# Patient Record
Sex: MALE | Race: WHITE | Employment: UNEMPLOYED | URBAN - METROPOLITAN AREA
[De-identification: names, ages, dates, MRNs, and addresses within clinical notes are randomized per-mention and may not be internally consistent; named-entity substitution may affect disease eponyms.]

---

## 2022-10-03 ENCOUNTER — TELEPHONE (OUTPATIENT)
Dept: PHARMACY | Age: 32
End: 2022-10-03

## 2022-10-03 DIAGNOSIS — I42.2 HYPERTROPHIC CARDIOMYOPATHY (HCC): ICD-10-CM

## 2022-10-03 DIAGNOSIS — Z95.2 STATUS POST AORTIC VALVE REPLACEMENT: Primary | ICD-10-CM

## 2022-10-03 NOTE — TELEPHONE ENCOUNTER
New patient referral received. Spoke to patient and mother on speaker phone. Scheduled for 10/10/22. For Pharmacy Admin Tracking Only    Intervention Detail:   Total # of Interventions Recommended:    Total # of Interventions Accepted:   Time Spent (min): 10

## 2022-10-10 ENCOUNTER — TELEPHONE (OUTPATIENT)
Dept: PHARMACY | Age: 32
End: 2022-10-10

## 2022-10-10 ENCOUNTER — ANTI-COAG VISIT (OUTPATIENT)
Dept: PHARMACY | Age: 32
End: 2022-10-10
Payer: MEDICARE

## 2022-10-10 DIAGNOSIS — I42.2 HYPERTROPHIC CARDIOMYOPATHY (HCC): Primary | ICD-10-CM

## 2022-10-10 DIAGNOSIS — Z95.2 S/P AVR: ICD-10-CM

## 2022-10-10 LAB
INTERNATIONAL NORMALIZATION RATIO, POC: 2.6
POC INR: 2.6 INDEX
PROTHROMBIN TIME, POC: 30.7 SECONDS (ref 10–14.3)

## 2022-10-10 PROCEDURE — 99203 OFFICE O/P NEW LOW 30 MIN: CPT

## 2022-10-10 PROCEDURE — 85610 PROTHROMBIN TIME: CPT

## 2022-10-10 PROCEDURE — 36416 COLLJ CAPILLARY BLOOD SPEC: CPT

## 2022-10-10 RX ORDER — WARFARIN SODIUM 7.5 MG/1
7.5 TABLET ORAL
COMMUNITY

## 2022-10-10 RX ORDER — METOPROLOL SUCCINATE 100 MG/1
100 TABLET, EXTENDED RELEASE ORAL 2 TIMES DAILY
COMMUNITY

## 2022-10-10 RX ORDER — WARFARIN SODIUM 5 MG/1
5 TABLET ORAL DAILY
COMMUNITY

## 2022-10-10 RX ORDER — SPIRONOLACTONE 50 MG/1
50 TABLET, FILM COATED ORAL DAILY
COMMUNITY

## 2022-10-10 NOTE — PROGRESS NOTES
Medication Management Service  [facility]  [phone]    Visit Date: 10/10/2022   Subjective:       Kesha Guardado is a 28 y.o. male who presents to clinic today for anticoagulation monitoring and adjustment. Patient seen in clinic for warfarin management due to  Indication:   mechanical aortic valve. INR goal: of  2.5-3.5 on referral. Patient states goal previously was 2-3 . Duration of therapy: indefinite. Patient reports the following:   Adherent with regimen  Missed or extra doses:  None   Bleeding or thromboembolic side effects:  None  Significant medication changes:  None  Significant dietary changes: None  Significant alcohol or tobacco changes: None  Significant recent illness, disease state changes, or hospitalization:  None  Upcoming surgeries or procedures: November 4  Falls: None           Assessment and Plan     PT/INR done in office per protocol. INR today is 2.6, therapeutic. He has bridged with Lovenox in the past for procedures. He has not received pre-op directions yet from surgeon. Plan: Will continue current regimen of warfarin 7.5mg daily except 5mg on MWF. Recheck INR in 2 week(s). Patient is new to clinic. Patient provided written education pamphlet and verbal counseling regarding warfarin's mechanism of action, compliance in taking medication as instructed, PT/INR monitoring, follow up with healthcare provider, side effects, drug and food reactions and interactions and dietary advice. Patient verbalized understanding of dosing directions and information discussed. Dosing schedule given to patient including phone number, appointment date, and time. Progress note sent to referring office. Patient acknowledges working in consult agreement with pharmacist as referred by his/her physician.       Electronically signed by BRANDIN Murrieta [facility] on 10/10/22 at 10:16 AM EDT    For Pharmacy Admin Tracking Only    Intervention Detail:   Total # of Interventions Recommended:    Total # of Interventions Accepted:   Time Spent (min): 30

## 2022-10-10 NOTE — TELEPHONE ENCOUNTER
Patient stated at appointment his INR goal has been 2.0-3.0. Left message at office of Dr. Saintclair Hopes to confirm INR goal 2.0-3.0. Referral sent 2.5-3.5   and phone call previously to confirm goal.    For Pharmacy Admin Tracking Only    Intervention Detail:   Total # of Interventions Recommended:    Total # of Interventions Accepted:   Time Spent (min): 5

## 2022-10-20 NOTE — TELEPHONE ENCOUNTER
Per Dr. Baldemar Flores does want patient bridged with Lovenox for procedure 11/4/22. For Pharmacy Admin Tracking Only    Intervention Detail:   Total # of Interventions Recommended:    Total # of Interventions Accepted:   Time Spent (min): 5

## 2022-10-21 NOTE — PROGRESS NOTES
Medication Management Service  [facility]  [phone]    Visit Date: 10/24/2022   Subjective:       Sterling Barrientos is a 28 y.o. male who presents to clinic today for anticoagulation monitoring and adjustment. Patient seen in clinic for warfarin management due to  Indication:   mechanical aortic valve and hypertrophic cardiomyopathy . INR goal: of 2.0-3.0. Duration of therapy: indefinite. Patient reports the following:   Adherent with regimen  Missed or extra doses:  None   Bleeding or thromboembolic side effects:  None  Significant medication changes:  Zpack , Nyquil/Dayquil  Significant dietary changes: eating less when sick last week  Significant alcohol or tobacco changes: None  Significant recent illness, disease state changes, or hospitalization:  None  Upcoming surgeries or procedures:  surgery [facility] 11/4/22  Falls: None           Assessment and Plan     PT/INR done in office per protocol. INR today is 3.4, supratherapeutic. Plan:  Take warfarin 5mg tomorrow then will continue current regimen of warfarin 7.5mg daily except 5mg on MWF. Lovenox Rx called to [facility] at [facility]  #20 syringes/   Patient's procedure scheduled for 11/4/22. Hold warfarin for 7 days prior to procedure per surgeon. Last dose of warfarin on 10/27/22. Bridge with lovenox at 1mg/kg. Patient weight is 100kg. Start Lovenox 100 mg twice daily on 10/30/22. Last dose of Lovenox AM on 11/3/22. Restart warfarin and Lovenox following procedure at direction of physician performing procedure. Patient states he was told admission will be for 3-4 days. Requested call when he knows discharge date and dose instructions will be given at that time. Patient and his mother voice understanding of administration of injection. Written instructions provided also. Recheck INR in 2.5 week(s). Patient verbalized understanding of dosing directions and information discussed.  Dosing schedule given to patient including phone number, appointment date, and time. Progress note sent to referring office. Patient acknowledges working in consult agreement with pharmacist as referred by his/her physician.       Electronically signed by BRANDIN He [facility] on 10/21/22 at 12:21 PM EDT    For Pharmacy Admin Tracking Only    Intervention Detail: Dose Adjustment: 1, reason: Therapy Optimization and New Rx: 1, reason: Needs Additional Therapy  Total # of Interventions Recommended: 2  Total # of Interventions Accepted: 2  Time Spent (min): 30

## 2022-10-24 ENCOUNTER — ANTI-COAG VISIT (OUTPATIENT)
Dept: PHARMACY | Age: 32
End: 2022-10-24
Payer: MEDICARE

## 2022-10-24 DIAGNOSIS — Z95.2 S/P AVR: ICD-10-CM

## 2022-10-24 DIAGNOSIS — I42.2 HYPERTROPHIC CARDIOMYOPATHY (HCC): Primary | ICD-10-CM

## 2022-10-24 LAB
INTERNATIONAL NORMALIZATION RATIO, POC: 3.4
POC INR: 3.4 INDEX
PROTHROMBIN TIME, POC: 40.6 SECONDS (ref 10–14.3)

## 2022-10-24 PROCEDURE — 85610 PROTHROMBIN TIME: CPT

## 2022-10-24 PROCEDURE — 99213 OFFICE O/P EST LOW 20 MIN: CPT

## 2022-10-24 PROCEDURE — 36416 COLLJ CAPILLARY BLOOD SPEC: CPT

## 2022-10-24 RX ORDER — ENOXAPARIN SODIUM 100 MG/ML
100 INJECTION SUBCUTANEOUS 2 TIMES DAILY
Qty: 20 EACH | Refills: 0 | OUTPATIENT
Start: 2022-10-30 | End: 2022-11-07 | Stop reason: SDUPTHER

## 2022-11-07 ENCOUNTER — TELEPHONE (OUTPATIENT)
Dept: PHARMACY | Age: 32
End: 2022-11-07

## 2022-11-07 DIAGNOSIS — I42.2 HYPERTROPHIC CARDIOMYOPATHY (HCC): Primary | ICD-10-CM

## 2022-11-07 DIAGNOSIS — Z95.2 S/P AVR: ICD-10-CM

## 2022-11-07 RX ORDER — ENOXAPARIN SODIUM 100 MG/ML
100 INJECTION SUBCUTANEOUS 2 TIMES DAILY
Qty: 10 EACH | Refills: 1 | OUTPATIENT
Start: 2022-11-07 | End: 2022-11-17 | Stop reason: ALTCHOICE

## 2022-11-07 NOTE — TELEPHONE ENCOUNTER
Mother left message patient was discharged home last night and patient took 7.5mg of warfarin. Returned call. Decreased appetite, taking oxycodone as needed. They have not picked up Lovenox from [facility] yet that was ordered from [facility]. I called [address] and spoke to pharmacist- they had filled 90 day supply of Lovenox 40mg twice daily after they clarified Rx with directions of injecting 0.997ml from [facility] and received clarification to change to 1 syringe (0.4ml). Advised pharmacist to return and I would send new order. Called new order to voicemail at [facility]. From [facility] discharge notes:   Lovenox/Coumadin  You are being given a prescription for lovenox which you need to take therapeutic twice per day (BID) starting 11/6/2022 until you have your INR checked by your primary care doctor. Start taking your coumadin tomorrow 11/6. Make sure to set up an appointment with them to have the INR checked within the next week and they will determine when your INR is therapeutic.    enoxaparin (LOVENOX) 40 mg/0.4 mL   Inject 0.997 mL subcutaneously twice daily. 72 mL   0 11/05/2022 12/05/2022   enoxaparin (LOVENOX) 40 mg/0.4 mL   Inject 0.4 mL subcutaneously twice daily.          For Pharmacy Admin Tracking Only    Intervention Detail: Refill(s) Provided  Total # of Interventions Recommended: 1  Total # of Interventions Accepted: 1  Time Spent (min): 30

## 2022-11-10 ENCOUNTER — ANTI-COAG VISIT (OUTPATIENT)
Dept: PHARMACY | Age: 32
End: 2022-11-10
Payer: MEDICARE

## 2022-11-10 DIAGNOSIS — Z95.2 S/P AVR: ICD-10-CM

## 2022-11-10 DIAGNOSIS — I42.2 HYPERTROPHIC CARDIOMYOPATHY (HCC): Primary | ICD-10-CM

## 2022-11-10 LAB
INTERNATIONAL NORMALIZATION RATIO, POC: 1.6
POC INR: 1.6 INDEX
PROTHROMBIN TIME, POC: 18.6 SECONDS (ref 10–14.3)

## 2022-11-10 PROCEDURE — 99212 OFFICE O/P EST SF 10 MIN: CPT

## 2022-11-10 PROCEDURE — 36416 COLLJ CAPILLARY BLOOD SPEC: CPT

## 2022-11-10 PROCEDURE — 85610 PROTHROMBIN TIME: CPT

## 2022-11-10 NOTE — PROGRESS NOTES
Medication Management Service  [facility]  [phone]    Visit Date: 11/10/2022   Subjective:       Cherlyn Litten is a 33 y.o. male who presents to clinic today for anticoagulation monitoring and adjustment. Patient seen in clinic for warfarin management due to  Indication:   mechanical aortic valve. INR goal: of 2.0-3.0. Duration of therapy: indefinite. Patient reports the following:   Adherent with regimen  Missed or extra doses:  None   Bleeding or thromboembolic side effects:  None  Significant medication changes:  oxycodone, tylenol after surgery- oxycodone stopped now  Significant dietary changes: increasing post-surgery  Significant alcohol or tobacco changes: None  Significant recent illness, disease state changes, or hospitalization:  None  Upcoming surgeries or procedures:  None  Falls: None           Assessment and Plan     PT/INR done in office per protocol. INR today is 1.6, subtherapeutic, rising as expected after restarting warfarin 11/6. Plan: Take warfarin 10mg today then will continue current regimen of warfarin 7.5mg daily except 5mg on MWF. Continue Lovenox 100mg injected twice daily. Recheck INR in 4 days     Patient verbalized understanding of dosing directions and information discussed. Dosing schedule given to patient including phone number, appointment date, and time. Progress note sent to referring office. Patient acknowledges working in consult agreement with pharmacist as referred by his/her physician.       Electronically signed by BRANDIN Crockett [facility] on 11/10/22 at 1:39 PM EST    For Pharmacy Admin Tracking Only    Intervention Detail: Dose Adjustment: 1, reason: Therapy Optimization  Total # of Interventions Recommended: 1  Total # of Interventions Accepted: 1  Time Spent (min): 20

## 2022-11-14 ENCOUNTER — ANTI-COAG VISIT (OUTPATIENT)
Dept: PHARMACY | Age: 32
End: 2022-11-14
Payer: MEDICARE

## 2022-11-14 ENCOUNTER — TELEPHONE (OUTPATIENT)
Dept: PHARMACY | Age: 32
End: 2022-11-14

## 2022-11-14 DIAGNOSIS — I42.2 HYPERTROPHIC CARDIOMYOPATHY (HCC): Primary | ICD-10-CM

## 2022-11-14 DIAGNOSIS — Z95.2 S/P AVR: ICD-10-CM

## 2022-11-14 LAB
INTERNATIONAL NORMALIZATION RATIO, POC: 1.9
POC INR: 1.9 INDEX
PROTHROMBIN TIME, POC: 22.7 SECONDS (ref 10–14.3)

## 2022-11-14 PROCEDURE — 99212 OFFICE O/P EST SF 10 MIN: CPT

## 2022-11-14 PROCEDURE — 36416 COLLJ CAPILLARY BLOOD SPEC: CPT

## 2022-11-14 NOTE — PROGRESS NOTES
Medication Management Service  [facility]  [phone]    Visit Date: 11/14/2022   Subjective:       Steven Simmons is a 33 y.o. male who presents to clinic today for anticoagulation monitoring and adjustment. Patient seen in clinic for warfarin management due to  Indication:   mechanical aortic valve. INR goal: of 2.0-3.0. Duration of therapy: indefinite. Patient reports the following:   Adherent with regimen  Missed or extra doses:  None   Bleeding or thromboembolic side effects: bleeding at site of lovenox injection   Significant medication changes:  None  Significant dietary changes: egg roll  Significant alcohol or tobacco changes: None  Significant recent illness, disease state changes, or hospitalization:  bruising and pain from Lovenox  Upcoming surgeries or procedures:  None  Falls: None           Assessment and Plan     PT/INR done in office per protocol. INR today is 1.9, subtherapeutic. Patient reports Lovenox injections becoming more painful and he is \"running out of real estate. \" Bruises appear within normal range. He does report some bleeding from 2 of the injection sites upon administration. Plan: Recommended he continue Lovenox injections until INR therapeutic. Patient has 3 syringes at home and he would prefer to finish his available stock. Advised a refill is available on his prescription. Advised if he can not tolerate injections, he should prioritize continuing for next 24 hours. Take warfarin 10mg today then  will continue current regimen of warfarin 7.5mg daily except 5mg on MWF. Recheck INR in 3 days. Patient verbalized understanding of dosing directions and information discussed. Dosing schedule given to patient including phone number, appointment date, and time. Progress note sent to referring office. Patient acknowledges working in consult agreement with pharmacist as referred by his/her physician.       Electronically signed by Markus Boswell [facility] on 11/14/22 at 3:13 PM EST    For Pharmacy Admin Tracking Only    Intervention Detail: Dose Adjustment: 1, reason: Therapy Optimization  Total # of Interventions Recommended: 1  Total # of Interventions Accepted: 1  Time Spent (min): 15

## 2022-11-14 NOTE — TELEPHONE ENCOUNTER
Patient requested later appointment. Moved 9:15am to 3:15pm today. For Pharmacy Admin Tracking Only    Intervention Detail:   Total # of Interventions Recommended:    Total # of Interventions Accepted:   Time Spent (min): 5

## 2022-11-17 ENCOUNTER — ANTI-COAG VISIT (OUTPATIENT)
Dept: PHARMACY | Age: 32
End: 2022-11-17
Payer: MEDICARE

## 2022-11-17 DIAGNOSIS — Z95.2 S/P AVR: ICD-10-CM

## 2022-11-17 DIAGNOSIS — I42.2 HYPERTROPHIC CARDIOMYOPATHY (HCC): Primary | ICD-10-CM

## 2022-11-17 LAB
INTERNATIONAL NORMALIZATION RATIO, POC: 2
POC INR: 2 INDEX
PROTHROMBIN TIME, POC: 23.4 SECONDS (ref 10–14.3)

## 2022-11-17 PROCEDURE — 36416 COLLJ CAPILLARY BLOOD SPEC: CPT

## 2022-11-17 PROCEDURE — 85610 PROTHROMBIN TIME: CPT

## 2022-11-17 PROCEDURE — 99212 OFFICE O/P EST SF 10 MIN: CPT

## 2022-11-17 NOTE — PROGRESS NOTES
Medication Management Service  [facility]  [phone]    Visit Date: 11/17/2022   Subjective:       Roger Lehman is a 33 y.o. male who presents to clinic today for anticoagulation monitoring and adjustment. Patient seen in clinic for warfarin management due to  Indication:   mechanical aortic valve. INR goal: of 2.0-3.0. Duration of therapy: indefinite. Patient reports the following:   Adherent with regimen  Missed or extra doses:  None   Bleeding or thromboembolic side effects:  None  Significant medication changes:  None  Significant dietary changes: None  Significant alcohol or tobacco changes: None  Significant recent illness, disease state changes, or hospitalization:  None  Upcoming surgeries or procedures:  None  Falls: None           Assessment and Plan     PT/INR done in office per protocol. INR today is 2.0, therapeutic. Last lovenox shot he took was Monday night due to pain/bruising. INR rising slowly after 10mg doses. Last 7 day dose of 52.5mg. Advised to call if he starts drinking Ensure. Plan:  Increase weekly dose ~6% to warfarin 7.5mg daily except 5mg on Mondays and Thursdays. Recheck INR in 1.5 week(s). Patient verbalized understanding of dosing directions and information discussed. Dosing schedule given to patient including phone number, appointment date, and time. Progress note sent to referring office. Patient acknowledges working in consult agreement with pharmacist as referred by his/her physician.       Electronically signed by Zohreh Adan [facility] on 11/17/22 at 9:24 AM EST    For Pharmacy Admin Tracking Only    Intervention Detail: Dose Adjustment: 1, reason: Therapy Optimization  Total # of Interventions Recommended: 1  Total # of Interventions Accepted: 1  Time Spent (min): 15

## 2022-11-28 ENCOUNTER — ANTI-COAG VISIT (OUTPATIENT)
Dept: PHARMACY | Age: 32
End: 2022-11-28
Payer: MEDICARE

## 2022-11-28 DIAGNOSIS — Z95.2 S/P AVR: ICD-10-CM

## 2022-11-28 DIAGNOSIS — I42.2 HYPERTROPHIC CARDIOMYOPATHY (HCC): Primary | ICD-10-CM

## 2022-11-28 LAB
INTERNATIONAL NORMALIZATION RATIO, POC: 2.6
POC INR: 2.6 INDEX
PROTHROMBIN TIME, POC: 31.7 SECONDS (ref 10–14.3)

## 2022-11-28 PROCEDURE — 99211 OFF/OP EST MAY X REQ PHY/QHP: CPT

## 2022-11-28 PROCEDURE — 85610 PROTHROMBIN TIME: CPT

## 2022-11-28 PROCEDURE — 36416 COLLJ CAPILLARY BLOOD SPEC: CPT

## 2022-11-28 NOTE — PROGRESS NOTES
Medication Management Service  [facility]  [phone]    Visit Date: 11/28/2022   Subjective:       Lars Valadez is a 33 y.o. male who presents to clinic today for anticoagulation monitoring and adjustment. Patient seen in clinic for warfarin management due to  Indication:   mechanical aortic valve. INR goal: of 2.0-3.0. Duration of therapy: indefinite. Patient reports the following:   Adherent with regimen  Missed or extra doses:  None   Bleeding or thromboembolic side effects:  None  Significant medication changes:  None  Significant dietary changes: None  Significant alcohol or tobacco changes: None  Significant recent illness, disease state changes, or hospitalization:  None  Upcoming surgeries or procedures:  None  Falls: None           Assessment and Plan     PT/INR done in office per protocol. INR today is 2.6, therapeutic. He has appointment at [facility] end of this week. Plan: Will continue current regimen of warfarin 7.5mg daily except 5mg on Mondays and Thursdays. Recheck INR in 3 week(s). Patient verbalized understanding of dosing directions and information discussed. Dosing schedule given to patient including phone number, appointment date, and time. Progress note sent to referring office. Patient acknowledges working in consult agreement with pharmacist as referred by his/her physician. Electronically signed by Bhumika Figueroa [facility] on 11/28/22 at 11:35 AM EST    For Pharmacy Admin Tracking Only    Intervention Detail:   Total # of Interventions Recommended:    Total # of Interventions Accepted:   Time Spent (min): 15

## 2022-12-19 ENCOUNTER — ANTI-COAG VISIT (OUTPATIENT)
Dept: PHARMACY | Age: 32
End: 2022-12-19
Payer: MEDICARE

## 2022-12-19 DIAGNOSIS — Z95.2 S/P AVR: ICD-10-CM

## 2022-12-19 DIAGNOSIS — I42.2 HYPERTROPHIC CARDIOMYOPATHY (HCC): Primary | ICD-10-CM

## 2022-12-19 LAB
INTERNATIONAL NORMALIZATION RATIO, POC: 2.8
POC INR: 2.8 INDEX
PROTHROMBIN TIME, POC: 33.5 SECONDS (ref 10–14.3)

## 2022-12-19 PROCEDURE — 36416 COLLJ CAPILLARY BLOOD SPEC: CPT

## 2022-12-19 PROCEDURE — 99211 OFF/OP EST MAY X REQ PHY/QHP: CPT

## 2022-12-19 PROCEDURE — 85610 PROTHROMBIN TIME: CPT

## 2022-12-19 NOTE — PROGRESS NOTES
Medication Management Service  [facility]  [phone]    Visit Date: 12/19/2022   Subjective:       Kevin Carson is a 33 y.o. male who presents to clinic today for anticoagulation monitoring and adjustment. Patient seen in clinic for warfarin management due to  Indication:   mechanical aortic valve and hypertrophic cardiomyopathy . INR goal: of 2.0-3.0. Duration of therapy: indefinite. Patient reports the following:   Adherent with regimen  Missed or extra doses:  None   Bleeding or thromboembolic side effects:  None  Significant medication changes:  None  Significant dietary changes: None  Significant alcohol or tobacco changes: None  Significant recent illness, disease state changes, or hospitalization:  None  Upcoming surgeries or procedures:  None  Falls: None           Assessment and Plan     PT/INR done in office per protocol. INR today is 2.8, therapeutic. Plan: Will continue current regimen of warfarin 7.5mg daily except 5mg on Mondays and Thursdays. Recheck INR in 4.5 week(s). Patient verbalized understanding of dosing directions and information discussed. Dosing schedule given to patient including phone number, appointment date, and time. Progress note sent to referring office. Patient acknowledges working in consult agreement with pharmacist as referred by his/her physician. Electronically signed by Maryana Rangel, [facility] on 12/19/22 at 10:25 AM EST    For Pharmacy Admin Tracking Only    Intervention Detail:   Total # of Interventions Recommended:    Total # of Interventions Accepted:   Time Spent (min): 15

## 2023-01-19 ENCOUNTER — ANTI-COAG VISIT (OUTPATIENT)
Dept: PHARMACY | Age: 33
End: 2023-01-19
Payer: MEDICARE

## 2023-01-19 DIAGNOSIS — Z95.2 S/P AVR: ICD-10-CM

## 2023-01-19 DIAGNOSIS — I42.2 HYPERTROPHIC CARDIOMYOPATHY (HCC): Primary | ICD-10-CM

## 2023-01-19 LAB
INTERNATIONAL NORMALIZATION RATIO, POC: 2.5
POC INR: 2.5 INDEX
PROTHROMBIN TIME, POC: 30.1 SECONDS (ref 10–14.3)

## 2023-01-19 PROCEDURE — 36416 COLLJ CAPILLARY BLOOD SPEC: CPT

## 2023-01-19 PROCEDURE — 99212 OFFICE O/P EST SF 10 MIN: CPT

## 2023-01-19 PROCEDURE — 85610 PROTHROMBIN TIME: CPT

## 2023-01-19 RX ORDER — WARFARIN SODIUM 7.5 MG/1
7.5 TABLET ORAL DAILY
Qty: 65 TABLET | Refills: 1 | OUTPATIENT
Start: 2023-01-19

## 2023-01-19 NOTE — PROGRESS NOTES
Medication Management Service  [facility]  [phone]    Visit Date: 1/19/2023   Subjective:       Alberto Dixon is a 33 y.o. male who presents to clinic today for anticoagulation monitoring and adjustment. Patient seen in clinic for warfarin management due to  Indication:   mechanical aortic valve, hypertrophic cardiomyopathy  INR goal: of 2.0-3.0. Duration of therapy: indefinite. Patient reports the following:   Adherent with regimen  Missed or extra doses:  None   Bleeding or thromboembolic side effects:  None  Significant medication changes:  None  Significant dietary changes: None  Significant alcohol or tobacco changes: None  Significant recent illness, disease state changes, or hospitalization:  None  Upcoming surgeries or procedures:  None  Falls: into mud today           Assessment and Plan     PT/INR done in office per protocol. INR today is 2.5, therapeutic. Refill needed  Plan: Will continue current regimen of warfarin 7.5mg daily except 5mg on Mondays and Thursdays. Refill called to [address] [place] [address] INR in 4 week(s). Patient verbalized understanding of dosing directions and information discussed. Dosing schedule given to patient including phone number, appointment date, and time. Progress note sent to referring office. Patient acknowledges working in consult agreement with pharmacist as referred by his/her physician.       Electronically signed by BRANDIN Ames [facility] on 1/19/23 at 8:22  [facility] Only    Intervention Detail: Refill(s) Provided  Total # of Interventions Recommended: 1  Total # of Interventions Accepted: 1  Time Spent (min): 15

## 2023-02-16 ENCOUNTER — ANTI-COAG VISIT (OUTPATIENT)
Dept: PHARMACY | Age: 33
End: 2023-02-16
Payer: MEDICARE

## 2023-02-16 DIAGNOSIS — Z95.2 S/P AVR: ICD-10-CM

## 2023-02-16 DIAGNOSIS — I42.2 HYPERTROPHIC CARDIOMYOPATHY (HCC): Primary | ICD-10-CM

## 2023-02-16 LAB
INTERNATIONAL NORMALIZATION RATIO, POC: 2.2
POC INR: 2.2 INDEX
PROTHROMBIN TIME, POC: 26.2 SECONDS (ref 10–14.3)

## 2023-02-16 PROCEDURE — 85610 PROTHROMBIN TIME: CPT

## 2023-02-16 PROCEDURE — 99211 OFF/OP EST MAY X REQ PHY/QHP: CPT

## 2023-02-16 PROCEDURE — 36416 COLLJ CAPILLARY BLOOD SPEC: CPT

## 2023-02-16 NOTE — PROGRESS NOTES
Medication Management Service  [facility]  [phone]    Visit Date: 2/16/2023   Subjective:       Allyson Sellers is a 33 y.o. male who presents to clinic today for anticoagulation monitoring and adjustment. Patient seen in clinic for warfarin management due to  Indication:   mechanical aortic valve and hypertrophic cardiomyopatjy . INR goal: of 2.0-3.0. Duration of therapy: indefinite. Patient reports the following:   Adherent with regimen  Missed or extra doses:  possible missed dose  Bleeding or thromboembolic side effects: cut finger day 3  Significant medication changes:  None  Significant dietary changes: None  Significant alcohol or tobacco changes: None  Significant recent illness, disease state changes, or hospitalization:  None  Upcoming surgeries or procedures:  None  Falls: None           Assessment and Plan     PT/INR done in office per protocol. INR today is 2.2, therapeutic. Cut on left ring finger, today is day 3, is bandaged. Plan: Will continue current regimen of warfarin 7.5mg daily except 5mg on Mondays and Thursdays. Recheck INR in 4 week(s)- will schedule with [facility] after her next visit. Patient verbalized understanding of dosing directions and information discussed. Dosing schedule given to patient including phone number, appointment date, and time. Progress note sent to referring office. Patient acknowledges working in consult agreement with pharmacist as referred by his/her physician. Electronically signed by BRANDIN Irwin [facility] on 2/16/23 at 10:28 AM EST    For Pharmacy Admin Tracking Only    Intervention Detail:   Total # of Interventions Recommended:    Total # of Interventions Accepted:   Time Spent (min): 15

## 2023-03-23 ENCOUNTER — TELEPHONE (OUTPATIENT)
Dept: PHARMACY | Age: 33
End: 2023-03-23

## 2023-03-23 NOTE — TELEPHONE ENCOUNTER
Spoke to Mercedes in person and scheduled patient for 4/3/23. For Pharmacy Admin Tracking Only    Intervention Detail:   Total # of Interventions Recommended:    Total # of Interventions Accepted:   Time Spent (min): 5

## 2023-03-30 ENCOUNTER — TELEPHONE (OUTPATIENT)
Dept: PHARMACY | Age: 33
End: 2023-03-30

## 2023-03-30 NOTE — TELEPHONE ENCOUNTER
Confirmed appointment for 4/3/23. For Pharmacy Admin Tracking Only    Intervention Detail:   Total # of Interventions Recommended:    Total # of Interventions Accepted:   Time Spent (min): 5

## 2023-04-17 ENCOUNTER — ANTI-COAG VISIT (OUTPATIENT)
Dept: PHARMACY | Age: 33
End: 2023-04-17
Payer: MEDICARE

## 2023-04-17 DIAGNOSIS — Z95.2 S/P AVR: ICD-10-CM

## 2023-04-17 DIAGNOSIS — I42.2 HYPERTROPHIC CARDIOMYOPATHY (HCC): Primary | ICD-10-CM

## 2023-04-17 LAB
INTERNATIONAL NORMALIZATION RATIO, POC: 1.8
POC INR: 1.8 INDEX
PROTHROMBIN TIME, POC: 22.1 SECONDS (ref 10–14.3)

## 2023-04-17 PROCEDURE — 99212 OFFICE O/P EST SF 10 MIN: CPT

## 2023-04-17 PROCEDURE — 85610 PROTHROMBIN TIME: CPT

## 2023-04-17 PROCEDURE — 36416 COLLJ CAPILLARY BLOOD SPEC: CPT

## 2023-04-17 NOTE — PROGRESS NOTES
Medication Management Service  [facility]  [phone]    Visit Date: 4/17/2023   Subjective:       Catalino Figueroa is a 33 y.o. male who presents to clinic today for anticoagulation monitoring and adjustment. Patient seen in clinic for warfarin management due to  Indication:   mechanical aortic valve and hypertrophic cardiomyopathy . INR goal: of 2.0-3.0. Duration of therapy: indefinite. Patient reports the following:   Adherent with regimen  Missed or extra doses:  None   Bleeding or thromboembolic side effects:  None  Significant medication changes:  None  Significant dietary changes: None  Significant alcohol or tobacco changes: None  Significant recent illness, disease state changes, or hospitalization:  None  Upcoming surgeries or procedures:  None  Falls: None           Assessment and Plan     PT/INR done in office per protocol. INR today is 1.8, subtherapeutic. INR down from 3.4 at visit 1 week ago. He goes to [facility] 4[facility]19. Plan: Increase weekly dose ~5% to warfarin 7.5mg daily except 5mg on Thursdays. Recheck INR in 1.5 week(s). Patient verbalized understanding of dosing directions and information discussed. Dosing schedule given to patient including phone number, appointment date, and time. Progress note sent to referring office. Patient acknowledges working in consult agreement with pharmacist as referred by his/her physician.       Electronically signed by Chiqui Little [address] [place] [address] on 4/17/23 at 10:18 AM EDT    For Pharmacy Admin Tracking Only    Intervention Detail: Dose Adjustment: 1, reason: Therapy Optimization  Total # of Interventions Recommended: 1  Total # of Interventions Accepted: 1  Time Spent (min): 15

## 2023-04-27 ENCOUNTER — TELEPHONE (OUTPATIENT)
Dept: PHARMACY | Age: 33
End: 2023-04-27

## 2023-04-27 ENCOUNTER — ANTI-COAG VISIT (OUTPATIENT)
Dept: PHARMACY | Age: 33
End: 2023-04-27
Payer: MEDICARE

## 2023-04-27 DIAGNOSIS — I42.2 HYPERTROPHIC CARDIOMYOPATHY (HCC): Primary | ICD-10-CM

## 2023-04-27 DIAGNOSIS — Z95.2 S/P AVR: ICD-10-CM

## 2023-04-27 LAB
INTERNATIONAL NORMALIZATION RATIO, POC: 2.2
POC INR: 2.2 INDEX
PROTHROMBIN TIME, POC: 26.8 SECONDS (ref 10–14.3)

## 2023-04-27 PROCEDURE — 36416 COLLJ CAPILLARY BLOOD SPEC: CPT

## 2023-04-27 PROCEDURE — 85610 PROTHROMBIN TIME: CPT

## 2023-04-27 PROCEDURE — 99212 OFFICE O/P EST SF 10 MIN: CPT

## 2023-04-27 RX ORDER — WARFARIN SODIUM 7.5 MG/1
7.5 TABLET ORAL DAILY
Qty: 78 TABLET | Refills: 0 | OUTPATIENT
Start: 2023-04-27

## 2023-04-27 NOTE — PROGRESS NOTES
Medication Management Service  [facility]  [phone]    Visit Date: 4/27/2023   Subjective:       Darwin Burgos is a 33 y.o. male who presents to clinic today for anticoagulation monitoring and adjustment. Patient seen in clinic for warfarin management due to  Indication:   mechanical aortic valve. INR goal: of 2.0-3.0. Duration of therapy: indefinite. Patient reports the following:   Adherent with regimen  Missed or extra doses:  None   Bleeding or thromboembolic side effects:  None  Significant medication changes:  None  Significant dietary changes:   Significant alcohol or tobacco changes: None  Significant recent illness, disease state changes, or hospitalization:  None  Upcoming surgeries or procedures:  None  Falls: None           Assessment and Plan     PT/INR done in office per protocol. INR today is 2.2, therapeutic. May 16 procedure at [facility]- does not know if warfarin is to be held. I will contact [facility] regarding pre-procedure instructions  Refill needed  Plan: Will continue current regimen of warfarin 7.5mg daily except 5mg on Thursdays. Refill called to Monik Collins  to Katherin Kitchen RPh  Recheck INR in 2 week(s). Patient verbalized understanding of dosing directions and information discussed. Dosing schedule given to patient including phone number, appointment date, and time. Progress note sent to referring office. Patient acknowledges working in consult agreement with pharmacist as referred by his/her physician.       Electronically signed by Amrita Rodriguez [address] [place] [address] on 4/27/23 at 10:04 AM EDT    For Pharmacy Admin Tracking Only    Intervention Detail: Refill(s) Provided  Total # of Interventions Recommended: 1  Total # of Interventions Accepted: 1  Time Spent (min): 15

## 2023-05-08 RX ORDER — ENOXAPARIN SODIUM 100 MG/ML
90 INJECTION SUBCUTANEOUS 2 TIMES DAILY
Qty: 20 EACH | Refills: 1 | OUTPATIENT
Start: 2023-05-08

## 2023-05-08 NOTE — TELEPHONE ENCOUNTER
Lovenox called to [facility] to [employer]. They will order more in order to have full quantity needed. For Pharmacy Admin Tracking Only    Intervention Detail:   Total # of Interventions Recommended:    Total # of Interventions Accepted:   Time Spent (min): 5

## 2023-05-11 ENCOUNTER — TELEPHONE (OUTPATIENT)
Dept: PHARMACY | Age: 33
End: 2023-05-11

## 2023-05-11 ENCOUNTER — ANTI-COAG VISIT (OUTPATIENT)
Dept: PHARMACY | Age: 33
End: 2023-05-11
Payer: MEDICARE

## 2023-05-11 DIAGNOSIS — Z95.2 S/P AVR: ICD-10-CM

## 2023-05-11 DIAGNOSIS — I42.2 HYPERTROPHIC CARDIOMYOPATHY (HCC): Primary | ICD-10-CM

## 2023-05-11 LAB
INTERNATIONAL NORMALIZATION RATIO, POC: 2
POC INR: 2 INDEX
PROTHROMBIN TIME, POC: 24.3 SECONDS (ref 10–14.3)

## 2023-05-11 PROCEDURE — 36416 COLLJ CAPILLARY BLOOD SPEC: CPT

## 2023-05-11 PROCEDURE — 99212 OFFICE O/P EST SF 10 MIN: CPT

## 2023-05-11 PROCEDURE — 85610 PROTHROMBIN TIME: CPT

## 2023-05-11 NOTE — PROGRESS NOTES
Medication Management Service  [facility]  [phone]    Visit Date: 5/11/2023   Subjective:       Tabatha Mcmanus is a 33 y.o. male who presents to clinic today for anticoagulation monitoring and adjustment. Patient seen in clinic for warfarin management due to  Indication:   mechanical aortic valve. INR goal: of 2.0-3.0. Duration of therapy: indefinite. Patient reports the following:   Adherent with regimen  Missed or extra doses:  None   Bleeding or thromboembolic side effects:  None  Significant medication changes:  None  Significant dietary changes: None  Significant alcohol or tobacco changes: None  Significant recent illness, disease state changes, or hospitalization:  None  Upcoming surgeries or procedures:  5/16  Falls: None           Assessment and Plan     PT/INR done in office per protocol. INR today is 2.0, therapeutic. Plan: Will continue current regimen of warfarin 7.5mg daily except 5mg on Thursdays. Patient's procedure scheduled for 5/16/23. Hold warfarin for 5 days prior to procedure per patient's surgeon. Last dose of warfarin on 5/10/23. Bridge with lovenox at 1mg/kg. Patient weight is 87.2kg. Start Lovenox 90 mg twice daily on 5/13/23. Last dose of Lovenox AM on 5/15/23. Restart warfarin and Lovenox following procedure at direction of physician performing procedure. Continue Lovenox and warfarin 10mg daily for 2 days then return to maintenance until INR therapeutic. Recheck INR in 1.5 week(s). Patient verbalized understanding of dosing directions and information discussed. Dosing schedule given to patient including phone number, appointment date, and time. Progress note sent to referring office. Patient acknowledges working in consult agreement with pharmacist as referred by his/her physician.       Electronically signed by Floyd Morel, [address] [place] [address] on 5/11/23 at 3:10 PM EDT    For Pharmacy Admin Tracking Only    Intervention Detail: Dose

## 2023-05-11 NOTE — TELEPHONE ENCOUNTER
Rescheduled for same day later at 3:00pm. For Pharmacy Admin Tracking Only    Intervention Detail:   Total # of Interventions Recommended:    Total # of Interventions Accepted:   Time Spent (min): 5

## 2023-05-22 ENCOUNTER — ANTI-COAG VISIT (OUTPATIENT)
Dept: PHARMACY | Age: 33
End: 2023-05-22
Payer: MEDICARE

## 2023-05-22 DIAGNOSIS — I42.2 HYPERTROPHIC CARDIOMYOPATHY (HCC): Primary | ICD-10-CM

## 2023-05-22 DIAGNOSIS — Z95.2 S/P AVR: ICD-10-CM

## 2023-05-22 LAB
INTERNATIONAL NORMALIZATION RATIO, POC: 1.7
POC INR: 1.7 INDEX
PROTHROMBIN TIME, POC: 19.9 SECONDS (ref 10–14.3)

## 2023-05-22 PROCEDURE — 36416 COLLJ CAPILLARY BLOOD SPEC: CPT

## 2023-05-22 PROCEDURE — 99212 OFFICE O/P EST SF 10 MIN: CPT

## 2023-05-22 PROCEDURE — 85610 PROTHROMBIN TIME: CPT

## 2023-05-22 NOTE — PROGRESS NOTES
Medication Management Service  [facility]  [phone]    Visit Date: 5/22/2023   Subjective:       Rena Simpson is a 33 y.o. male who presents to clinic today for anticoagulation monitoring and adjustment. Patient seen in clinic for warfarin management due to  Indication:   mechanical aortic valve. INR goal: of 2.0-3.0. Duration of therapy: indefinite. Patient reports the following:   Adherent with regimen  Missed or extra doses:  held 6 days total for procedure 5/16  Bleeding or thromboembolic side effects:  None  Significant medication changes:  None  Significant dietary changes: Atkins meal replacement bars  Significant alcohol or tobacco changes: None  Significant recent illness, disease state changes, or hospitalization:  None  Upcoming surgeries or procedures:  None  Falls: None           Assessment and Plan     PT/INR done in office per protocol. INR today is 1.7, subtherapeutic, restarted warfarin 5/17 following procedure        Plan:  Continue Lovenox twice daily until INR therapeutic. Take warfarin 10mg today then will continue current regimen of warfarin 7.5mg daily except 5mg on Thursdays. .     Recheck INR in 3 days. Patient verbalized understanding of dosing directions and information discussed. Dosing schedule given to patient including phone number, appointment date, and time. Progress note sent to referring office. Patient acknowledges working in consult agreement with pharmacist as referred by his/her physician.       Electronically signed by Reji Eubanks, [address] [place] [address] on 5/22/23 at 11:42 AM EDT    For Pharmacy Admin Tracking Only    Intervention Detail: Dose Adjustment: 1, reason: Therapy Optimization  Total # of Interventions Recommended: 1  Total # of Interventions Accepted: 1  Time Spent (min): 15

## 2023-05-25 ENCOUNTER — ANTI-COAG VISIT (OUTPATIENT)
Dept: PHARMACY | Age: 33
End: 2023-05-25
Payer: MEDICARE

## 2023-05-25 DIAGNOSIS — I42.2 HYPERTROPHIC CARDIOMYOPATHY (HCC): Primary | ICD-10-CM

## 2023-05-25 DIAGNOSIS — Z95.2 S/P AVR: ICD-10-CM

## 2023-05-25 LAB
INTERNATIONAL NORMALIZATION RATIO, POC: 2.2
POC INR: 2.2 INDEX
PROTHROMBIN TIME, POC: 25.8 SECONDS (ref 10–14.3)

## 2023-05-25 PROCEDURE — 36416 COLLJ CAPILLARY BLOOD SPEC: CPT

## 2023-05-25 PROCEDURE — 99212 OFFICE O/P EST SF 10 MIN: CPT

## 2023-05-25 PROCEDURE — 85610 PROTHROMBIN TIME: CPT

## 2023-05-25 NOTE — PROGRESS NOTES
Medication Management Service  [facility]  [phone]    Visit Date: 5/25/2023   Subjective:       Elias Ahumada is a 33 y.o. male who presents to clinic today for anticoagulation monitoring and adjustment. Patient seen in clinic for warfarin management due to  Indication:   mechanical aortic valve. INR goal: of 2.0-3.0. Duration of therapy: indefinite. Patient reports the following:   Adherent with regimen  Missed or extra doses:  None   Bleeding or thromboembolic side effects:  None  Significant medication changes:  None  Significant dietary changes: None  Significant alcohol or tobacco changes: None  Significant recent illness, disease state changes, or hospitalization:  None  Upcoming surgeries or procedures:  None  Falls: None           Assessment and Plan     PT/INR done in office per protocol. INR today is 2.2, therapeutic. Last 7 day dose of 57.5mg, with 3 days of 10mg since restarting warfarin following procedure. Patient likely needs higher maintenance dose. INR prior to procedure was 2.0, before he started new meal replacement bar. Plan: Stop Lovenox. Increase weekly dose 5% to warfarin 7.5mg daily. Recheck INR in 2 week(s). Patient verbalized understanding of dosing directions and information discussed. Dosing schedule given to patient including phone number, appointment date, and time. Progress note sent to referring office. Patient acknowledges working in consult agreement with pharmacist as referred by his/her physician.       Electronically signed by Richard Preciado, [address] [place] [address] on 5/25/23 at 11:55 AM EDT    For Pharmacy Admin Tracking Only    Intervention Detail: Dose Adjustment: 1, reason: Therapy Optimization  Total # of Interventions Recommended: 1  Total # of Interventions Accepted: 1  Time Spent (min): 15

## 2023-06-08 ENCOUNTER — ANTI-COAG VISIT (OUTPATIENT)
Dept: PHARMACY | Age: 33
End: 2023-06-08
Payer: MEDICARE

## 2023-06-08 DIAGNOSIS — I42.2 HYPERTROPHIC CARDIOMYOPATHY (HCC): Primary | ICD-10-CM

## 2023-06-08 DIAGNOSIS — Z95.2 S/P AVR: ICD-10-CM

## 2023-06-08 LAB
INTERNATIONAL NORMALIZATION RATIO, POC: 1.6
POC INR: 1.6 INDEX
PROTHROMBIN TIME, POC: 19.1 SECONDS (ref 10–14.3)

## 2023-06-08 PROCEDURE — 99212 OFFICE O/P EST SF 10 MIN: CPT

## 2023-06-08 PROCEDURE — 85610 PROTHROMBIN TIME: CPT

## 2023-06-08 PROCEDURE — 36416 COLLJ CAPILLARY BLOOD SPEC: CPT

## 2023-06-08 NOTE — PROGRESS NOTES
Medication Management Service  [facility]  [phone]    Visit Date: 6/8/2023   Subjective:       Kassi Bradford is a 33 y.o. male who presents to clinic today for anticoagulation monitoring and adjustment. Patient seen in clinic for warfarin management due to  Indication:   mechanical aortic valve. INR goal: of 2.0-3.0. Duration of therapy: indefinite. Patient reports the following:   Adherent with regimen  Missed or extra doses:  None   Bleeding or thromboembolic side effects:  None  Significant medication changes:  None  Significant dietary changes: had some cabbage last week, protein bars daily  Significant alcohol or tobacco changes: None  Significant recent illness, disease state changes, or hospitalization:  None  Upcoming surgeries or procedures:  None  Falls: None           Assessment and Plan     PT/INR done in office per protocol. INR today is 1.6, subtherapeutic. Plan: Take warfarin 10mg daily for 2 days then increase weekly dose ~10% to warfarin 7.5mg daily except 10mg on Mondays and Thursdays     Recheck INR in 2 week(s). Patient verbalized understanding of dosing directions and information discussed. Dosing schedule given to patient including phone number, appointment date, and time. Progress note sent to referring office. Patient acknowledges working in consult agreement with pharmacist as referred by his/her physician.       Electronically signed by Lisette Mendoza [address] [place] [address] on 6/8/23 at 8:36 AM EDT    For Pharmacy Admin Tracking Only    Intervention Detail: Dose Adjustment: 1, reason: Therapy Optimization  Total # of Interventions Recommended: 1  Total # of Interventions Accepted: 1  Time Spent (min): 15

## 2023-06-22 ENCOUNTER — ANTI-COAG VISIT (OUTPATIENT)
Dept: PHARMACY | Age: 33
End: 2023-06-22
Payer: MEDICARE

## 2023-06-22 DIAGNOSIS — Z95.2 S/P AVR: ICD-10-CM

## 2023-06-22 DIAGNOSIS — I42.2 HYPERTROPHIC CARDIOMYOPATHY (HCC): Primary | ICD-10-CM

## 2023-06-22 LAB
INTERNATIONAL NORMALIZATION RATIO, POC: 2.5
POC INR: 2.5 INDEX
PROTHROMBIN TIME, POC: 30.3 SECONDS (ref 10–14.3)

## 2023-06-22 PROCEDURE — 85610 PROTHROMBIN TIME: CPT

## 2023-06-22 PROCEDURE — 99211 OFF/OP EST MAY X REQ PHY/QHP: CPT

## 2023-06-22 PROCEDURE — 36416 COLLJ CAPILLARY BLOOD SPEC: CPT

## 2023-06-22 NOTE — PROGRESS NOTES
Medication Management Service  [facility]  [phone]    Visit Date: 6/22/2023   Subjective:       Hugh John is a 33 y.o. male who presents to clinic today for anticoagulation monitoring and adjustment. Patient seen in clinic for warfarin management due to  Indication:   mechanical aortic valve, hypertrophic cardiomyopathy  INR goal: of 2.0-3.0. Duration of therapy: indefinite. Patient reports the following:   Adherent with regimen  Missed or extra doses:  None   Bleeding or thromboembolic side effects:  None  Significant medication changes:  None  Significant dietary changes: None  Significant alcohol or tobacco changes: None  Significant recent illness, disease state changes, or hospitalization:  185lb today   Upcoming surgeries or procedures:  None  Falls: None           Assessment and Plan     PT/INR done in office per protocol. INR today is 2.5, therapeutic. Plan: Will continue current regimen of warfarin 7.5mg daily except 10mg on Mondays and Thursdays. Recheck INR in 1.5 week(s). Patient verbalized understanding of dosing directions and information discussed. Dosing schedule given to patient including phone number, appointment date, and time. Progress note sent to referring office. Patient acknowledges working in consult agreement with pharmacist as referred by his/her physician. Electronically signed by Laura Vincent [facility] on 6/22/23 at 10:22 AM EDT    For Pharmacy Admin Tracking Only    Intervention Detail:   Total # of Interventions Recommended:    Total # of Interventions Accepted:   Time Spent (min): 15

## 2023-07-03 ENCOUNTER — ANTI-COAG VISIT (OUTPATIENT)
Dept: PHARMACY | Age: 33
End: 2023-07-03
Payer: MEDICARE

## 2023-07-03 DIAGNOSIS — I42.2 HYPERTROPHIC CARDIOMYOPATHY (HCC): Primary | ICD-10-CM

## 2023-07-03 DIAGNOSIS — Z95.2 S/P AVR: ICD-10-CM

## 2023-07-03 LAB
INTERNATIONAL NORMALIZATION RATIO, POC: 1.8
POC INR: 1.8 INDEX
PROTHROMBIN TIME, POC: 21.1 SECONDS (ref 10–14.3)

## 2023-07-03 PROCEDURE — 36416 COLLJ CAPILLARY BLOOD SPEC: CPT

## 2023-07-03 PROCEDURE — 85610 PROTHROMBIN TIME: CPT

## 2023-07-03 PROCEDURE — 99213 OFFICE O/P EST LOW 20 MIN: CPT

## 2023-07-03 RX ORDER — WARFARIN SODIUM 7.5 MG/1
7.5 TABLET ORAL DAILY
Qty: 52 TABLET | Refills: 1 | OUTPATIENT
Start: 2023-07-03

## 2023-07-03 RX ORDER — WARFARIN SODIUM 5 MG/1
10 TABLET ORAL DAILY
Qty: 78 TABLET | Refills: 1 | OUTPATIENT
Start: 2023-07-03

## 2023-07-03 NOTE — PROGRESS NOTES
Medication Management Service  [facility]  [phone]    Visit Date: 7/3/2023   Subjective:       Kadi Macdonald is a 33 y.o. male who presents to clinic today for anticoagulation monitoring and adjustment. Patient seen in clinic for warfarin management due to  Indication:   mechanical aortic valve. INR goal: of 2.0-3.0. Duration of therapy: indefinite. Patient reports the following:   Adherent with regimen  Missed or extra doses:  None   Bleeding or thromboembolic side effects:  None  Significant medication changes:  None  Significant dietary changes: New protein bar  Significant alcohol or tobacco changes: None  Significant recent illness, disease state changes, or hospitalization:  None  Upcoming surgeries or procedures:  None  Falls: None           Assessment and Plan     PT/INR done in office per protocol. INR today is 1.8, subtherapeutic. Refills requested  Plan: Increase weekly dose ~4% to warfarin 7.5mg daily except 10mg on MWF     Recheck INR in 1-2 week(s)- will schedule with his mom after her next visit     Patient verbalized understanding of dosing directions and information discussed. Dosing schedule given to patient including phone number, appointment date, and time. Progress note sent to referring office. Patient acknowledges working in consult agreement with pharmacist as referred by his/her physician.       Electronically signed by Michaela Chapa [facility] on 7/3/23   For Pharmacy Admin Tracking Only    Intervention Detail: Dose Adjustment: 1, reason: Therapy Optimization and Refill(s) Provided  Total # of Interventions Recommended: 2  Total # of Interventions Accepted: 2  Time Spent (min): 20

## 2023-07-10 ENCOUNTER — TELEPHONE (OUTPATIENT)
Dept: PHARMACY | Age: 33
End: 2023-07-10

## 2023-07-10 NOTE — TELEPHONE ENCOUNTER
Left message to schedule for 7/17 at 9:45am.     For Pharmacy Admin Tracking Only    Intervention Detail:   Total # of Interventions Recommended:    Total # of Interventions Accepted:   Time Spent (min): 5

## 2023-07-10 NOTE — TELEPHONE ENCOUNTER
Patient left message to confirm appointment ok. For Pharmacy Admin Tracking Only    Intervention Detail:   Total # of Interventions Recommended:    Total # of Interventions Accepted:   Time Spent (min): 5

## 2023-07-17 ENCOUNTER — ANTI-COAG VISIT (OUTPATIENT)
Dept: PHARMACY | Age: 33
End: 2023-07-17
Payer: MEDICARE

## 2023-07-17 DIAGNOSIS — I42.2 HYPERTROPHIC CARDIOMYOPATHY (HCC): Primary | ICD-10-CM

## 2023-07-17 DIAGNOSIS — Z95.2 S/P AVR: ICD-10-CM

## 2023-07-17 LAB
INTERNATIONAL NORMALIZATION RATIO, POC: 1.9
POC INR: 1.9 INDEX
PROTHROMBIN TIME, POC: 22.8 SECONDS (ref 10–14.3)

## 2023-07-17 PROCEDURE — 36416 COLLJ CAPILLARY BLOOD SPEC: CPT

## 2023-07-17 PROCEDURE — 99211 OFF/OP EST MAY X REQ PHY/QHP: CPT

## 2023-07-17 PROCEDURE — 85610 PROTHROMBIN TIME: CPT

## 2023-07-17 NOTE — PROGRESS NOTES
Medication Management Service  [facility]  [phone]    Visit Date: 7/17/2023   Subjective:       Moon Chavez is a 33 y.o. male who presents to clinic today for anticoagulation monitoring and adjustment. Patient seen in clinic for warfarin management due to  Indication:   mechanical aortic valve. INR goal: of 2.0-3.0. Duration of therapy: indefinite. Patient reports the following:   Adherent with regimen  Missed or extra doses:  Missed 7.5mg on Thursday  Bleeding or thromboembolic side effects:  None  Significant medication changes:  None  Significant dietary changes: None  Significant alcohol or tobacco changes: None  Significant recent illness, disease state changes, or hospitalization:  weight 184. 6 pounds  Upcoming surgeries or procedures:  None  Falls: None           Assessment and Plan     PT/INR done in office per protocol. INR today is 1.9, subtherapeutic, likely due to missed dose. Plan: Will continue current regimen of warfarin 7.5mg daily except 10mg on MWF. Recheck INR in 2 week(s). Patient verbalized understanding of dosing directions and information discussed. Dosing schedule given to patient including phone number, appointment date, and time. Progress note sent to referring office. Patient acknowledges working in consult agreement with pharmacist as referred by his/her physician. Electronically signed by Angelito Simental, [facility] on 7/17/23 at 9:43 AM EDT    For Pharmacy Admin Tracking Only    Intervention Detail:   Total # of Interventions Recommended:    Total # of Interventions Accepted:   Time Spent (min): 15

## 2023-07-31 ENCOUNTER — ANTI-COAG VISIT (OUTPATIENT)
Dept: PHARMACY | Age: 33
End: 2023-07-31
Payer: MEDICARE

## 2023-07-31 DIAGNOSIS — Z95.2 S/P AVR: ICD-10-CM

## 2023-07-31 DIAGNOSIS — I42.2 HYPERTROPHIC CARDIOMYOPATHY (HCC): Primary | ICD-10-CM

## 2023-07-31 LAB
INTERNATIONAL NORMALIZATION RATIO, POC: 2.5
POC INR: 2.5 INDEX
PROTHROMBIN TIME, POC: 30.4 SECONDS (ref 10–14.3)

## 2023-07-31 PROCEDURE — 99211 OFF/OP EST MAY X REQ PHY/QHP: CPT

## 2023-07-31 PROCEDURE — 36416 COLLJ CAPILLARY BLOOD SPEC: CPT

## 2023-07-31 PROCEDURE — 85610 PROTHROMBIN TIME: CPT

## 2023-07-31 NOTE — PROGRESS NOTES
Medication Management Service  [facility]  [phone]    Visit Date: 7/31/2023   Subjective:       Kali Beltran is a 33 y.o. male who presents to clinic today for anticoagulation monitoring and adjustment. Patient seen in clinic for warfarin management due to  Indication:   mechanical aortic valve. INR goal: of 2.0-3.0. Duration of therapy: indefinite. Patient reports the following:   Adherent with regimen  Missed or extra doses:  None   Bleeding or thromboembolic side effects:  None  Significant medication changes:  None  Significant dietary changes: None  Significant alcohol or tobacco changes: None  Significant recent illness, disease state changes, or hospitalization:  None  Upcoming surgeries or procedures:  None  Falls: None           Assessment and Plan     PT/INR done in office per protocol. INR today is 2.5, therapeutic. Billing question- verified correct insurance on file. Referred to billing customer service. Plan: Will continue current regimen of warfarin 7.5mg daily except 10mg on MWF. Recheck INR in 2 week(s). Patient verbalized understanding of dosing directions and information discussed. Dosing schedule given to patient including phone number, appointment date, and time. Progress note sent to referring office. Electronically signed by Lyn Hensley [facility] on 7/31/23     For Pharmacy Admin Tracking Only    Intervention Detail:   Total # of Interventions Recommended:    Total # of Interventions Accepted:   Time Spent (min): 15

## 2023-08-14 ENCOUNTER — ANTI-COAG VISIT (OUTPATIENT)
Dept: PHARMACY | Age: 33
End: 2023-08-14
Payer: MEDICARE

## 2023-08-14 DIAGNOSIS — I42.2 HYPERTROPHIC CARDIOMYOPATHY (HCC): Primary | ICD-10-CM

## 2023-08-14 DIAGNOSIS — Z95.2 S/P AVR: ICD-10-CM

## 2023-08-14 LAB
INTERNATIONAL NORMALIZATION RATIO, POC: 4.1
POC INR: 4.1 INDEX
PROTHROMBIN TIME, POC: 48.7 SECONDS (ref 10–14.3)

## 2023-08-14 PROCEDURE — 36416 COLLJ CAPILLARY BLOOD SPEC: CPT

## 2023-08-14 PROCEDURE — 85610 PROTHROMBIN TIME: CPT

## 2023-08-14 PROCEDURE — 99212 OFFICE O/P EST SF 10 MIN: CPT

## 2023-08-14 NOTE — PROGRESS NOTES
Medication Management Service  [facility]  [phone]    Visit Date: 8/14/2023   Subjective:       Leydi Blount is a 33 y.o. male who presents to clinic today for anticoagulation monitoring and adjustment. Patient seen in clinic for warfarin management due to  Indication:   mechanical aortic valve. INR goal: of 2.0-3.0. Duration of therapy: indefinite. Patient reports the following:   Adherent with regimen  Missed or extra doses:  None   Bleeding or thromboembolic side effects:  None  Significant medication changes:  None  Significant dietary changes: less vitamin K  Significant alcohol or tobacco changes: None  Significant recent illness, disease state changes, or hospitalization:  None  Upcoming surgeries or procedures:  None  Falls: None           Assessment and Plan     PT/INR done in office per protocol. INR today is 4.1, supratherapeutic. Discussed options of lowering dose or returning to increased vitamin K intake. Patient prefers to lower dose. He will have egg rolls and coleslaw today to lower INR, but then plans for less overall vitamin K intake. Plan:  Decrease weekly dose ~13% to warfarin 7.5mg daily. Recheck INR in 1.5 week(s). Patient verbalized understanding of dosing directions and information discussed. Dosing schedule given to patient including phone number, appointment date, and time. Progress note sent to referring office.     Electronically signed by Kristin St [address] [place] [address] on 8/14/23     For Pharmacy Admin Tracking Only    Intervention Detail: Dose Adjustment: 1, reason: Therapy Optimization  Total # of Interventions Recommended: 1  Total # of Interventions Accepted: 1  Time Spent (min): 15

## 2023-08-22 DIAGNOSIS — Z95.2 S/P AVR: ICD-10-CM

## 2023-08-22 DIAGNOSIS — I42.2 HYPERTROPHIC CARDIOMYOPATHY (HCC): Primary | ICD-10-CM

## 2023-08-22 NOTE — PROGRESS NOTES
Signed paper referral received from Dr. Tea Brady. Entered in [address] [place] [address].

## 2023-08-24 ENCOUNTER — ANTI-COAG VISIT (OUTPATIENT)
Dept: PHARMACY | Age: 33
End: 2023-08-24
Payer: MEDICARE

## 2023-08-24 DIAGNOSIS — I42.2 HYPERTROPHIC CARDIOMYOPATHY (HCC): Primary | ICD-10-CM

## 2023-08-24 DIAGNOSIS — Z95.2 S/P AVR: ICD-10-CM

## 2023-08-24 LAB
INTERNATIONAL NORMALIZATION RATIO, POC: 2.6
POC INR: 2.6 INDEX
PROTHROMBIN TIME, POC: 31.1 SECONDS (ref 10–14.3)

## 2023-08-24 PROCEDURE — 36416 COLLJ CAPILLARY BLOOD SPEC: CPT

## 2023-08-24 PROCEDURE — 85610 PROTHROMBIN TIME: CPT

## 2023-08-24 PROCEDURE — 99211 OFF/OP EST MAY X REQ PHY/QHP: CPT

## 2023-08-24 NOTE — PROGRESS NOTES
Medication Management Service  [facility]  [phone]    Visit Date: 8/24/2023   Subjective:       Kyle Oropeza is a 33 y.o. male who presents to clinic today for anticoagulation monitoring and adjustment. Patient seen in clinic for warfarin management due to  Indication:   mechanical aortic valve. INR goal: of 2.0-3.0. Duration of therapy: indefinite. Patient reports the following:   Adherent with regimen  Missed or extra doses:  None   Bleeding or thromboembolic side effects:  None  Significant medication changes:  None  Significant dietary changes: None  Significant alcohol or tobacco changes: None  Significant recent illness, disease state changes, or hospitalization:  None  Upcoming surgeries or procedures:  None  Falls: None           Assessment and Plan     PT/INR done in office per protocol. INR today is 2.6, therapeutic. Plan: Will continue current regimen of warfarin 7.5mg daily. Recheck INR in 2 week(s). Patient verbalized understanding of dosing directions and information discussed. Dosing schedule given to patient including phone number, appointment date, and time. Progress note sent to referring office. Electronically signed by Raj Pollock [facility] on 8/24/23     For Pharmacy Admin Tracking Only    Intervention Detail:   Total # of Interventions Recommended:    Total # of Interventions Accepted:   Time Spent (min): 15

## 2023-08-25 DIAGNOSIS — Z95.2 S/P AVR: ICD-10-CM

## 2023-08-25 DIAGNOSIS — I42.2 HYPERTROPHIC CARDIOMYOPATHY (HCC): Primary | ICD-10-CM

## 2023-08-25 NOTE — PROGRESS NOTES
Signed paper referral received from Dr. Tato Encinas. Entered in [id number] [facility] [address].     For Pharmacy Admin Tracking Only    Time Spent (min): 5

## 2023-09-11 ENCOUNTER — ANTI-COAG VISIT (OUTPATIENT)
Dept: PHARMACY | Age: 33
End: 2023-09-11
Payer: MEDICARE

## 2023-09-11 DIAGNOSIS — I42.2 HYPERTROPHIC CARDIOMYOPATHY (HCC): Primary | ICD-10-CM

## 2023-09-11 DIAGNOSIS — Z95.2 S/P AVR: ICD-10-CM

## 2023-09-11 LAB
INTERNATIONAL NORMALIZATION RATIO, POC: 2.1
POC INR: 2.1 INDEX
PROTHROMBIN TIME, POC: 25.1 SECONDS (ref 10–14.3)

## 2023-09-11 PROCEDURE — 36416 COLLJ CAPILLARY BLOOD SPEC: CPT

## 2023-09-11 PROCEDURE — 85610 PROTHROMBIN TIME: CPT

## 2023-09-11 PROCEDURE — 99212 OFFICE O/P EST SF 10 MIN: CPT

## 2023-09-11 RX ORDER — WARFARIN SODIUM 7.5 MG/1
7.5 TABLET ORAL DAILY
Qty: 30 TABLET | Refills: 2 | OUTPATIENT
Start: 2023-09-11

## 2023-09-11 NOTE — PROGRESS NOTES
Medication Management Service  [facility]  [phone]    Visit Date: 9/11/2023   Subjective:       Maria Elena Longo is a 33 y.o. male who presents to clinic today for anticoagulation monitoring and adjustment. Patient seen in clinic for warfarin management due to  Indication:   mechanical aortic valve. INR goal: of 2.0-3.0. Duration of therapy: indefinite. Patient reports the following:   Adherent with regimen  Missed or extra doses:  None   Bleeding or thromboembolic side effects:  None  Significant medication changes:  None  Significant dietary changes: basil pesto   Significant alcohol or tobacco changes: None  Significant recent illness, disease state changes, or hospitalization:  None  Upcoming surgeries or procedures:  None  Falls: None           Assessment and Plan     PT/INR done in office per protocol. INR today is 2.1, therapeutic. Refill needed  Plan: Will continue current regimen of warfarin 7.5mg daily. Refill called to [employer]. Recheck INR in 3.5 week(s). Patient verbalized understanding of dosing directions and information discussed. Dosing schedule given to patient including phone number, appointment date, and time. Progress note sent to referring office.     Electronically signed by Marybeth Bentley [facility] on 9/11/23     For Pharmacy Admin Tracking Only    Intervention Detail: Refill(s) Provided  Total # of Interventions Recommended: 1  Total # of Interventions Accepted: 1  Time Spent (min): 15

## 2023-10-05 ENCOUNTER — ANTI-COAG VISIT (OUTPATIENT)
Dept: PHARMACY | Age: 33
End: 2023-10-05
Payer: MEDICARE

## 2023-10-05 DIAGNOSIS — I42.2 HYPERTROPHIC CARDIOMYOPATHY (HCC): Primary | ICD-10-CM

## 2023-10-05 DIAGNOSIS — Z95.2 S/P AVR: ICD-10-CM

## 2023-10-05 PROCEDURE — 85610 PROTHROMBIN TIME: CPT

## 2023-10-05 PROCEDURE — 99211 OFF/OP EST MAY X REQ PHY/QHP: CPT

## 2023-10-05 PROCEDURE — 36416 COLLJ CAPILLARY BLOOD SPEC: CPT

## 2023-10-05 NOTE — PROGRESS NOTES
Medication Management Service  [facility]  [phone]    Visit Date: 10/5/2023   Subjective:       Lashanda Brown is a 33 y.o. male who presents to clinic today for anticoagulation monitoring and adjustment. Patient seen in clinic for warfarin management due to  Indication:   mechanical aortic valve. INR goal: of 2.0-3.0. Duration of therapy: indefinite. Patient reports the following:   Adherent with regimen  Missed or extra doses:  missed dose last night, took this AM  Bleeding or thromboembolic side effects:  None  Significant medication changes:  doxycycline 9/22 for 7 days  Significant dietary changes: None  Significant alcohol or tobacco changes: None  Significant recent illness, disease state changes, or hospitalization:  None  Upcoming surgeries or procedures:  None  Falls: None           Assessment and Plan     PT/INR done in office per protocol. INR today is 2.2, therapeutic. Plan: Will continue current regimen of warfarin 7.5mg daily. Recheck INR in 4 week(s). Patient verbalized understanding of dosing directions and information discussed. Dosing schedule given to patient including phone number, appointment date, and time. Progress note sent to referring office. Electronically signed by BRANDIN Ramsay [facility] on 10/5/23     For Pharmacy Admin Tracking Only    Intervention Detail:   Total # of Interventions Recommended:    Total # of Interventions Accepted:   Time Spent (min): 15

## 2023-10-30 ENCOUNTER — ANTI-COAG VISIT (OUTPATIENT)
Dept: PHARMACY | Age: 33
End: 2023-10-30
Payer: MEDICARE

## 2023-10-30 DIAGNOSIS — I42.2 HYPERTROPHIC CARDIOMYOPATHY (HCC): Primary | ICD-10-CM

## 2023-10-30 DIAGNOSIS — Z95.2 S/P AVR: ICD-10-CM

## 2023-10-30 LAB
INTERNATIONAL NORMALIZATION RATIO, POC: 2.6
POC INR: 2.6 INDEX
PROTHROMBIN TIME, POC: 31.3 SECONDS (ref 10–14.3)

## 2023-10-30 PROCEDURE — 99211 OFF/OP EST MAY X REQ PHY/QHP: CPT

## 2023-10-30 PROCEDURE — 85610 PROTHROMBIN TIME: CPT

## 2023-10-30 PROCEDURE — 36416 COLLJ CAPILLARY BLOOD SPEC: CPT

## 2023-10-30 NOTE — PROGRESS NOTES
Medication Management Service  [facility]  [phone]    Visit Date: 10/30/2023   Subjective:       Merle Gonzalez is a 33 y.o. male who presents to clinic today for anticoagulation monitoring and adjustment. Patient seen in clinic for warfarin management due to  Indication:   mechanical aortic valve. INR goal: of 2.0-3.0. Duration of therapy: indefinite. Patient reports the following:   Adherent with regimen  Missed or extra doses:  None   Bleeding or thromboembolic side effects:  None  Significant medication changes:  None  Significant dietary changes: None  Significant alcohol or tobacco changes: None  Significant recent illness, disease state changes, or hospitalization:  None  Upcoming surgeries or procedures:  None  Falls: None           Assessment and Plan     PT/INR done in office per protocol. INR today is 2.6, therapeutic. Has [facility] clinic liver appointment tomorrow. Plan: Will continue current regimen of warfarin 7.5mg daily. Recheck INR in 4 week(s). Patient verbalized understanding of dosing directions and information discussed. Dosing schedule given to patient including phone number, appointment date, and time. Progress note sent to referring office. Electronically signed by Kunal Garrido [address] [place] [address] on 10/30/23     For Pharmacy Admin Tracking Only    Intervention Detail:   Total # of Interventions Recommended:    Total # of Interventions Accepted:   Time Spent (min): 15
Detail Level: Detailed
General Sunscreen Counseling: I recommended a broad spectrum sunscreen with a SPF of 30 or higher.  I explained that SPF 30 sunscreens block approximately 97 percent of the sun's harmful rays.  Sunscreens should be applied at least 15 minutes prior to expected sun exposure and then every 2 hours after that as long as sun exposure continues. If swimming or exercising sunscreen should be reapplied every 45 minutes to an hour after getting wet or sweating.  One ounce, or the equivalent of a shot glass full of sunscreen, is adequate to protect the skin not covered by a bathing suit. I also recommended a lip balm with a sunscreen as well. Sun protective clothing can be used in lieu of sunscreen but must be worn the entire time you are exposed to the sun's rays.

## 2023-11-24 NOTE — PROGRESS NOTES
Medication Management Service  [facility]  [phone]    Visit Date: 11/27/2023   Subjective:       Tad Crigler is a 29 y.o. male who presents to clinic today for anticoagulation monitoring and adjustment. Patient seen in clinic for warfarin management due to  Indication:   mechanical aortic valve. INR goal: of 2.0-3.0. Duration of therapy: indefinite. Patient reports the following:   Adherent with regimen  Missed or extra doses:  None   Bleeding or thromboembolic side effects:  None  Significant medication changes:  clindamycin,  does not interact with warfarin. Ibuprofen   Significant dietary changes: egg rolls  Significant alcohol or tobacco changes: None  Significant recent illness, disease state changes, or hospitalization:  None  Upcoming surgeries or procedures:  None  Falls: None           Assessment and Plan     PT/INR done in office per protocol. INR today is 2.0, therapeutic. Plan: Will continue current regimen of warfarin 7.5mg daily. Recheck INR in 3 week(s). Patient verbalized understanding of dosing directions and information discussed. Dosing schedule given to patient including phone number, appointment date, and time. Progress note sent to referring office. Electronically signed by BRANDIN Kimbrough [facility] on 11/24/23     For Pharmacy Admin Tracking Only    Intervention Detail:   Total # of Interventions Recommended:    Total # of Interventions Accepted:   Time Spent (min): 15

## 2023-11-27 ENCOUNTER — ANTI-COAG VISIT (OUTPATIENT)
Dept: PHARMACY | Age: 33
End: 2023-11-27
Payer: MEDICARE

## 2023-11-27 DIAGNOSIS — Z95.2 S/P AVR: ICD-10-CM

## 2023-11-27 DIAGNOSIS — I42.2 HYPERTROPHIC CARDIOMYOPATHY (HCC): Primary | ICD-10-CM

## 2023-11-27 LAB
INTERNATIONAL NORMALIZATION RATIO, POC: 2
POC INR: 2 INDEX
PROTHROMBIN TIME, POC: 23.9 SECONDS (ref 10–14.3)

## 2023-11-27 PROCEDURE — 99211 OFF/OP EST MAY X REQ PHY/QHP: CPT

## 2023-11-27 PROCEDURE — 36416 COLLJ CAPILLARY BLOOD SPEC: CPT

## 2023-11-27 PROCEDURE — 85610 PROTHROMBIN TIME: CPT

## 2024-01-08 ENCOUNTER — ANTI-COAG VISIT (OUTPATIENT)
Dept: PHARMACY | Age: 34
End: 2024-01-08
Payer: MEDICARE

## 2024-01-08 DIAGNOSIS — I42.2 HYPERTROPHIC CARDIOMYOPATHY (HCC): Primary | ICD-10-CM

## 2024-01-08 DIAGNOSIS — Z95.2 S/P AVR: ICD-10-CM

## 2024-01-08 LAB
INTERNATIONAL NORMALIZATION RATIO, POC: 1.7
POC INR: 1.7 INDEX
PROTHROMBIN TIME, POC: 20.2 SECONDS (ref 10–14.3)

## 2024-01-08 PROCEDURE — 99212 OFFICE O/P EST SF 10 MIN: CPT

## 2024-01-08 PROCEDURE — 36416 COLLJ CAPILLARY BLOOD SPEC: CPT

## 2024-01-08 PROCEDURE — 85610 PROTHROMBIN TIME: CPT

## 2024-01-08 NOTE — PROGRESS NOTES
Medication Management Service  [facility]  [phone]    Visit Date: 1/8/2024   Subjective:       Rah Andino is a 33 y.o. male who presents to clinic today for anticoagulation monitoring and adjustment.    Patient seen in clinic for warfarin management due to  Indication:   mechanical aortic valve.   INR goal: of 2.0-3.0.  Duration of therapy: indefinite.    Patient reports the following:   Adherent with regimen  Missed or extra doses:  None   Bleeding or thromboembolic side effects:  None  Significant medication changes:  None  Significant dietary changes: increase in vitamin K- premier protein  Significant alcohol or tobacco changes: None  Significant recent illness, disease state changes, or hospitalization:  None  Upcoming surgeries or procedures:  None  Falls: None           Assessment and Plan     PT/INR done in office per protocol.   INR today is 1.7, subtherapeutic.        Premier protein- 30mcg of vitamin K daily added. Started 3 days ago and had Ramen with seaweed/kale the past 2 days.   Plan:  Increase weekly dose ~10% to  warfarin 7.5mg daily except 10mg on Mondays and Thursdays.     Recheck INR in 2 week(s).     Patient verbalized understanding of dosing directions and information discussed. Dosing schedule given to patient including phone number, appointment date, and time. Progress note sent to referring office.    Electronically signed by Eloise Hamm RPH on 1/8/24     For Pharmacy Admin Tracking Only    Intervention Detail: Dose Adjustment: 1, reason: Therapy Optimization  Total # of Interventions Recommended: 1  Total # of Interventions Accepted: 1  Time Spent (min): 15

## 2024-01-22 ENCOUNTER — ANTI-COAG VISIT (OUTPATIENT)
Dept: PHARMACY | Age: 34
End: 2024-01-22
Payer: MEDICARE

## 2024-01-22 DIAGNOSIS — Z95.2 S/P AVR: ICD-10-CM

## 2024-01-22 DIAGNOSIS — I42.2 HYPERTROPHIC CARDIOMYOPATHY (HCC): Primary | ICD-10-CM

## 2024-01-22 PROCEDURE — 99211 OFF/OP EST MAY X REQ PHY/QHP: CPT

## 2024-01-22 PROCEDURE — 85610 PROTHROMBIN TIME: CPT

## 2024-01-22 PROCEDURE — 36416 COLLJ CAPILLARY BLOOD SPEC: CPT

## 2024-01-22 NOTE — PROGRESS NOTES
Medication Management Service  [facility]  [phone]    Visit Date: 1/22/2024   Subjective:       Rah Andino is a 33 y.o. male who presents to clinic today for anticoagulation monitoring and adjustment.    Patient seen in clinic for warfarin management due to  Indication:   mechanical aortic valve.   INR goal: of 2.0-3.0.  Duration of therapy: indefinite.    Patient reports the following:   Adherent with regimen  Missed or extra doses:  None   Bleeding or thromboembolic side effects:  None  Significant medication changes:  None  Significant dietary changes: protein drinks daily  Significant alcohol or tobacco changes: None  Significant recent illness, disease state changes, or hospitalization:  None  Upcoming surgeries or procedures:  None  Falls: None           Assessment and Plan     PT/INR done in office per protocol.   INR today is 2.6, therapeutic.          Plan:  Will continue current regimen of warfarin 7.5mg daily except 10mg on Mondays and Thursdays.     Recheck INR in 3 week(s).     Patient verbalized understanding of dosing directions and information discussed. Dosing schedule given to patient including phone number, appointment date, and time. Progress note sent to referring office.    Electronically signed by Eloise Hamm RPH on 1/22/24     For Pharmacy Admin Tracking Only    Intervention Detail:   Total # of Interventions Recommended:   Total # of Interventions Accepted:   Time Spent (min): 15

## 2024-02-12 ENCOUNTER — ANTI-COAG VISIT (OUTPATIENT)
Dept: PHARMACY | Age: 34
End: 2024-02-12
Payer: MEDICARE

## 2024-02-12 DIAGNOSIS — Z95.2 S/P AVR: ICD-10-CM

## 2024-02-12 DIAGNOSIS — I42.2 HYPERTROPHIC CARDIOMYOPATHY (HCC): Primary | ICD-10-CM

## 2024-02-12 LAB
INTERNATIONAL NORMALIZATION RATIO, POC: 2.7
POC INR: 2.7 INDEX
PROTHROMBIN TIME, POC: 32.7 SECONDS (ref 10–14.3)

## 2024-02-12 PROCEDURE — 99211 OFF/OP EST MAY X REQ PHY/QHP: CPT

## 2024-02-12 PROCEDURE — 85610 PROTHROMBIN TIME: CPT

## 2024-02-12 PROCEDURE — 36416 COLLJ CAPILLARY BLOOD SPEC: CPT

## 2024-02-12 NOTE — PROGRESS NOTES
Medication Management Service  [facility]  [phone]    Visit Date: 2/12/2024   Subjective:       Rah Andino is a 33 y.o. male who presents to clinic today for anticoagulation monitoring and adjustment.    Patient seen in clinic for warfarin management due to  Indication:   mechanical aortic valve.   INR goal: of 2.0-3.0.  Duration of therapy: indefinite.    Patient reports the following:   Adherent with regimen  Missed or extra doses:  None   Bleeding or thromboembolic side effects:  None  Significant medication changes:  None  Significant dietary changes: turnip greens, coleslaw Saturday   Significant alcohol or tobacco changes: None  Significant recent illness, disease state changes, or hospitalization:  None  Upcoming surgeries or procedures:  None  Falls: None           Assessment and Plan     PT/INR done in office per protocol.   INR today is 2.7, therapeutic.          Plan:  Will continue current regimen of warfarin 7.5mg daily except 10mg on MWF.     Recheck INR in 4 week(s).     Patient verbalized understanding of dosing directions and information discussed. Dosing schedule given to patient including phone number, appointment date, and time. Progress note sent to referring office.    Electronically signed by Eloise Hamm RPH on 2/12/24     For Pharmacy Admin Tracking Only    Intervention Detail:   Total # of Interventions Recommended:   Total # of Interventions Accepted:   Time Spent (min): 15

## 2024-03-04 ENCOUNTER — ANTI-COAG VISIT (OUTPATIENT)
Dept: PHARMACY | Age: 34
End: 2024-03-04
Payer: MEDICARE

## 2024-03-04 DIAGNOSIS — Z95.2 S/P AVR: ICD-10-CM

## 2024-03-04 DIAGNOSIS — I42.2 HYPERTROPHIC CARDIOMYOPATHY (HCC): Primary | ICD-10-CM

## 2024-03-04 LAB
INTERNATIONAL NORMALIZATION RATIO, POC: 3.7
POC INR: 3.7 INDEX
PROTHROMBIN TIME, POC: 44 SECONDS (ref 10–14.3)

## 2024-03-04 PROCEDURE — 36416 COLLJ CAPILLARY BLOOD SPEC: CPT

## 2024-03-04 PROCEDURE — 85610 PROTHROMBIN TIME: CPT

## 2024-03-04 PROCEDURE — 99212 OFFICE O/P EST SF 10 MIN: CPT

## 2024-03-04 NOTE — PROGRESS NOTES
Medication Management Service  [facility]  [phone]    Visit Date: 3/4/2024   Subjective:       Rah Andino is a 33 y.o. male who presents to clinic today for anticoagulation monitoring and adjustment.    Patient seen in clinic for warfarin management due to  Indication:   mechanical aortic valve, hypertrophic cardiomyopathy  INR goal: of 2.0-3.0.  Duration of therapy: indefinite.    Patient reports the following:   Adherent with regimen  Missed or extra doses:  None   Bleeding or thromboembolic side effects:  None  Significant medication changes:  None  Significant dietary changes: he plans for protein shakes daily, just  restarted, has been out for 3-4 weeks   Significant alcohol or tobacco changes: None  Significant recent illness, disease state changes, or hospitalization:  None  Upcoming surgeries or procedures:  None  Falls: None           Assessment and Plan     PT/INR done in office per protocol.   INR today is 3.7, supratherapeutic.        GI issues for last 5 days- some days milkshakes only. Plans to restart protein drinks daily.   Plan: Hold warfarin today then will continue current regimen of warfarin 7.5mg daily except 10mg on Mondays and Thursdays.     Recheck INR in 1.5 week(s).     Patient verbalized understanding of dosing directions and information discussed. Dosing schedule given to patient including phone number, appointment date, and time. Progress note sent to referring office.    Electronically signed by Eloise Hamm RPH on 3/4/24     For Pharmacy Admin Tracking Only    Intervention Detail: Dose Adjustment: 1, reason: Therapy De-escalation  Total # of Interventions Recommended: 1  Total # of Interventions Accepted: 1  Time Spent (min): 15    
No

## 2024-03-14 ENCOUNTER — ANTI-COAG VISIT (OUTPATIENT)
Dept: PHARMACY | Age: 34
End: 2024-03-14
Payer: MEDICARE

## 2024-03-14 DIAGNOSIS — Z95.2 S/P AVR: ICD-10-CM

## 2024-03-14 DIAGNOSIS — I42.2 HYPERTROPHIC CARDIOMYOPATHY (HCC): Primary | ICD-10-CM

## 2024-03-14 LAB
INTERNATIONAL NORMALIZATION RATIO, POC: 2.6
POC INR: 2.6 INDEX
PROTHROMBIN TIME, POC: 31.5 SECONDS (ref 10–14.3)

## 2024-03-14 PROCEDURE — 85610 PROTHROMBIN TIME: CPT

## 2024-03-14 PROCEDURE — 36416 COLLJ CAPILLARY BLOOD SPEC: CPT

## 2024-03-14 PROCEDURE — 99211 OFF/OP EST MAY X REQ PHY/QHP: CPT

## 2024-03-14 NOTE — PROGRESS NOTES
Medication Management Service  [facility]  [phone]    Visit Date: 3/14/2024   Subjective:       Rah Andino is a 33 y.o. male who presents to clinic today for anticoagulation monitoring and adjustment.    Patient seen in clinic for warfarin management due to  Indication:   mechanical aortic valve.   INR goal: of 2.0-3.0.  Duration of therapy: indefinite.    Patient reports the following:   Adherent with regimen  Missed or extra doses:  None   Bleeding or thromboembolic side effects:  None  Significant medication changes:  None  Significant dietary changes: protein bars instead of protein shakes  Significant alcohol or tobacco changes: None  Significant recent illness, disease state changes, or hospitalization:  None  Upcoming surgeries or procedures:  None  Falls: None           Assessment and Plan     PT/INR done in office per protocol.   INR today is 2.6, therapeutic.          Plan:  Will continue current regimen of warfarin 7.5mg daily except 10mg on Mondays and Thursdays.     Recheck INR in 4 week(s).     Patient verbalized understanding of dosing directions and information discussed. Dosing schedule given to patient including phone number, appointment date, and time. Progress note sent to referring office.    Electronically signed by Eloise Hamm RPH on 3/14/24     For Pharmacy Admin Tracking Only    Intervention Detail:   Total # of Interventions Recommended:   Total # of Interventions Accepted:   Time Spent (min): 15

## 2024-04-11 ENCOUNTER — ANTI-COAG VISIT (OUTPATIENT)
Dept: PHARMACY | Age: 34
End: 2024-04-11
Payer: MEDICARE

## 2024-04-11 DIAGNOSIS — Z95.2 S/P AVR: ICD-10-CM

## 2024-04-11 DIAGNOSIS — I42.2 HYPERTROPHIC CARDIOMYOPATHY (HCC): Primary | ICD-10-CM

## 2024-04-11 LAB
INTERNATIONAL NORMALIZATION RATIO, POC: 3.5
POC INR: 3.5 INDEX
PROTHROMBIN TIME, POC: 42.1 SECONDS (ref 10–14.3)

## 2024-04-11 PROCEDURE — 36416 COLLJ CAPILLARY BLOOD SPEC: CPT

## 2024-04-11 PROCEDURE — 99212 OFFICE O/P EST SF 10 MIN: CPT

## 2024-04-11 PROCEDURE — 85610 PROTHROMBIN TIME: CPT

## 2024-04-11 RX ORDER — ACETAMINOPHEN 160 MG
4000 TABLET,DISINTEGRATING ORAL DAILY
COMMUNITY

## 2024-04-11 NOTE — PROGRESS NOTES
Medication Management Service  [facility]  [phone]    Visit Date: 4/11/2024   Subjective:       Rah Andino is a 33 y.o. male who presents to clinic today for anticoagulation monitoring and adjustment.    Patient seen in clinic for warfarin management due to  Indication:   mechanical aortic valve.   INR goal: of 2.0-3.0.  Duration of therapy: indefinite.    Patient reports the following:   Adherent with regimen  Missed or extra doses:  None   Bleeding or thromboembolic side effects:  blood in stool  Significant medication changes:  ibuprofen 400mg x1  Significant dietary changes: None  Significant alcohol or tobacco changes: None  Significant recent illness, disease state changes, or hospitalization:  None  Upcoming surgeries or procedures:  None  Falls: None           Assessment and Plan     PT/INR done in office per protocol.   INR today is 3.5, supratherapeutic.        Advised to avoid NSAIDS  New PCP aware of rectal bleeding and patient contacting GI with [facility] per patient  Family history of colon cancer  Plan:  Take warfarin 5mg today then will continue current regimen of warfarin 7.5mg daily except 10mg on Mondays and Thursdays.     Advised ER if SOB, dizziness, increase in volume of blood in stool or black tarry stool.   Recheck INR in 1 week(s).     Patient verbalized understanding of dosing directions and information discussed. Dosing schedule given to patient including phone number, appointment date, and time. Progress note sent to referring office.    Electronically signed by Eloise Hamm RPH on 4/11/24     For Pharmacy Admin Tracking Only    Intervention Detail: Dose Adjustment: 1, reason: Therapy Optimization  Total # of Interventions Recommended: 1  Total # of Interventions Accepted: 1  Time Spent (min): 15

## 2024-04-18 ENCOUNTER — ANTI-COAG VISIT (OUTPATIENT)
Dept: PHARMACY | Age: 34
End: 2024-04-18
Payer: MEDICARE

## 2024-04-18 DIAGNOSIS — I42.2 HYPERTROPHIC CARDIOMYOPATHY (HCC): Primary | ICD-10-CM

## 2024-04-18 DIAGNOSIS — Z95.2 S/P AVR: ICD-10-CM

## 2024-04-18 LAB
INTERNATIONAL NORMALIZATION RATIO, POC: 2.8
POC INR: 2.8 INDEX
PROTHROMBIN TIME, POC: 33.7 SECONDS (ref 10–14.3)

## 2024-04-18 PROCEDURE — 36416 COLLJ CAPILLARY BLOOD SPEC: CPT

## 2024-04-18 PROCEDURE — 99211 OFF/OP EST MAY X REQ PHY/QHP: CPT

## 2024-04-18 PROCEDURE — 85610 PROTHROMBIN TIME: CPT

## 2024-04-18 RX ORDER — ERGOCALCIFEROL 1.25 MG/1
50000 CAPSULE ORAL WEEKLY
COMMUNITY

## 2024-04-18 NOTE — PROGRESS NOTES
Medication Management Service  [facility]  [phone]    Visit Date: 4/18/2024   Subjective:       Rah Andino is a 33 y.o. male who presents to clinic today for anticoagulation monitoring and adjustment.    Patient seen in clinic for warfarin management due to  Indication:   mechanical aortic valve.   INR goal: of 2.0-3.0.  Duration of therapy: indefinite.    Patient reports the following:   Adherent with regimen  Missed or extra doses:  None   Bleeding or thromboembolic side effects: blood in stool resolved  Significant medication changes:  None  Significant dietary changes: egg roll and spring roll  Significant alcohol or tobacco changes: None  Significant recent illness, disease state changes, or hospitalization:  none  Upcoming surgeries or procedures:  None  Falls: None           Assessment and Plan     PT/INR done in office per protocol.   INR today is 2.8, therapeutic.          Plan:  Will continue current regimen of warfarin 7.5mg daily except 10mg on Mondays and Thursdays.     Recheck INR in 2 week(s).     Patient verbalized understanding of dosing directions and information discussed. Dosing schedule given to patient including phone number, appointment date, and time. Progress note sent to referring office.    Electronically signed by Eloise Hamm RPH on 4/18/24     For Pharmacy Admin Tracking Only    Intervention Detail:   Total # of Interventions Recommended:   Total # of Interventions Accepted:   Time Spent (min): 15

## 2024-05-02 ENCOUNTER — ANTI-COAG VISIT (OUTPATIENT)
Dept: PHARMACY | Age: 34
End: 2024-05-02
Payer: MEDICARE

## 2024-05-02 DIAGNOSIS — I42.2 HYPERTROPHIC CARDIOMYOPATHY (HCC): Primary | ICD-10-CM

## 2024-05-02 DIAGNOSIS — Z95.2 S/P AVR: ICD-10-CM

## 2024-05-02 LAB
INTERNATIONAL NORMALIZATION RATIO, POC: 3.4
POC INR: 3.4 INDEX
PROTHROMBIN TIME, POC: 40.2 SECONDS (ref 10–14.3)

## 2024-05-02 PROCEDURE — 85610 PROTHROMBIN TIME: CPT

## 2024-05-02 PROCEDURE — 36416 COLLJ CAPILLARY BLOOD SPEC: CPT

## 2024-05-02 PROCEDURE — 99212 OFFICE O/P EST SF 10 MIN: CPT

## 2024-05-13 ENCOUNTER — ANTI-COAG VISIT (OUTPATIENT)
Dept: PHARMACY | Age: 34
End: 2024-05-13
Payer: MEDICARE

## 2024-05-13 DIAGNOSIS — I42.2 HYPERTROPHIC CARDIOMYOPATHY (HCC): Primary | ICD-10-CM

## 2024-05-13 DIAGNOSIS — Z95.2 S/P AVR: ICD-10-CM

## 2024-05-13 LAB
INTERNATIONAL NORMALIZATION RATIO, POC: 2.4
POC INR: 2.4 INDEX
PROTHROMBIN TIME, POC: 28.6 SECONDS (ref 10–14.3)

## 2024-05-13 PROCEDURE — 99211 OFF/OP EST MAY X REQ PHY/QHP: CPT

## 2024-05-13 PROCEDURE — 85610 PROTHROMBIN TIME: CPT

## 2024-05-13 PROCEDURE — 36416 COLLJ CAPILLARY BLOOD SPEC: CPT

## 2024-05-13 RX ORDER — NORTRIPTYLINE HYDROCHLORIDE 10 MG/1
10 CAPSULE ORAL NIGHTLY
COMMUNITY
Start: 2024-05-08

## 2024-05-13 NOTE — PROGRESS NOTES
Medication Management Service  [facility]  [phone]    Visit Date: 5/13/2024   Subjective:       Rah Andino is a 33 y.o. male who presents to clinic today for anticoagulation monitoring and adjustment.    Patient seen in clinic for warfarin management due to  Indication:   mechanical aortic valve.   INR goal: of 2.0-3.0.  Duration of therapy: indefinite.    Patient reports the following:   Adherent with regimen  Missed or extra doses:  None   Bleeding or thromboembolic side effects:  None  Significant medication changes:  Nortriptyline started, waiting on PA for Nurtec, Zofran PRN  Significant dietary changes: None  Significant alcohol or tobacco changes: None  Significant recent illness, disease state changes, or hospitalization:  None  Upcoming surgeries or procedures:  None  Falls: None           Assessment and Plan     PT/INR done in office per protocol.   INR today is 2.4, therapeutic.          Plan:  Will continue current regimen of warfarin 7.5mg daily except 10mg on Mondays.     Recheck INR in 3 week(s).     Patient verbalized understanding of dosing directions and information discussed. Dosing schedule given to patient including phone number, appointment date, and time. Progress note sent to referring office.    Electronically signed by Eloise Hamm RPH on 5/13/24     For Pharmacy Admin Tracking Only    Intervention Detail:   Total # of Interventions Recommended:   Total # of Interventions Accepted:   Time Spent (min): 15

## 2024-05-16 DIAGNOSIS — Z86.718 HISTORY OF DVT (DEEP VEIN THROMBOSIS): ICD-10-CM

## 2024-05-16 DIAGNOSIS — I48.91 ATRIAL FIBRILLATION, UNSPECIFIED TYPE (HCC): ICD-10-CM

## 2024-05-16 DIAGNOSIS — Z95.2 STATUS POST AORTIC VALVE REPLACEMENT: Primary | ICD-10-CM

## 2024-05-16 NOTE — PROGRESS NOTES
Signed paper referral received from Khurram Soliman CNP. Entered in Epic.    For Pharmacy Admin Tracking Only    Time Spent (min): 5

## 2024-06-03 ENCOUNTER — ANTI-COAG VISIT (OUTPATIENT)
Dept: PHARMACY | Age: 34
End: 2024-06-03
Payer: MEDICARE

## 2024-06-03 DIAGNOSIS — Z95.2 S/P AVR: ICD-10-CM

## 2024-06-03 DIAGNOSIS — I42.2 HYPERTROPHIC CARDIOMYOPATHY (HCC): Primary | ICD-10-CM

## 2024-06-03 LAB
INTERNATIONAL NORMALIZATION RATIO, POC: 4.6
POC INR: 4.6 INDEX
PROTHROMBIN TIME, POC: 55.2 SECONDS (ref 10–14.3)

## 2024-06-03 PROCEDURE — 99212 OFFICE O/P EST SF 10 MIN: CPT

## 2024-06-03 PROCEDURE — 36416 COLLJ CAPILLARY BLOOD SPEC: CPT

## 2024-06-03 PROCEDURE — 85610 PROTHROMBIN TIME: CPT

## 2024-06-03 NOTE — PROGRESS NOTES
Medication Management Service  [facility]  [phone]    Visit Date: 6/3/2024   Subjective:       Rah Andino is a 33 y.o. male who presents to clinic today for anticoagulation monitoring and adjustment.    Patient seen in clinic for warfarin management due to  Indication:   mechanical aortic valve.   INR goal: of 2.0-3.0.  Duration of therapy: indefinite.    Patient reports the following:   Adherent with regimen  Missed or extra doses:  missed a dose at night but took the next AM about 1-1.5 weeks ago  Bleeding or thromboembolic side effects:  None  Significant medication changes:  cephalexin, today is day 4  Significant dietary changes: None  Significant alcohol or tobacco changes: None  Significant recent illness, disease state changes, or hospitalization:  severe diarrhea for 2 days  Upcoming surgeries or procedures:  None  Falls: None           Assessment and Plan     PT/INR done in office per protocol.   INR today is 4.6, therapeutic.          Plan:  Hold warfarin today then take warfarin 3.75mg tomorrow. Then will continue current regimen of warfarin 7.5mg daily except 10mg on Mondays.   Patient advised of increased risk of bleeding at current INR. Advised patient to avoid activities that increase risk of falling or cutting him/herself. Advised patient to go to ER for fall, head injury, or bleeding. Patient voiced understanding.     Recheck INR in 1 week(s).     Patient verbalized understanding of dosing directions and information discussed. Dosing schedule given to patient including phone number, appointment date, and time. Progress note sent to referring office.    Electronically signed by Eloise Hamm RPH on 6/3/24     For Pharmacy Admin Tracking Only    Intervention Detail: Dose Adjustment: 1, reason: Therapy Optimization  Total # of Interventions Recommended: 1  Total # of Interventions Accepted: 1  Time Spent (min): 15

## 2024-06-10 ENCOUNTER — APPOINTMENT (OUTPATIENT)
Dept: PHARMACY | Age: 34
End: 2024-06-10
Payer: MEDICARE

## 2024-06-10 DIAGNOSIS — Z95.2 S/P AVR: ICD-10-CM

## 2024-06-10 DIAGNOSIS — I42.2 HYPERTROPHIC CARDIOMYOPATHY (HCC): Primary | ICD-10-CM

## 2024-06-10 LAB
INTERNATIONAL NORMALIZATION RATIO, POC: 2.1
POC INR: 2.1 INDEX
PROTHROMBIN TIME, POC: 25.6 SECONDS (ref 10–14.3)

## 2024-06-10 PROCEDURE — 36416 COLLJ CAPILLARY BLOOD SPEC: CPT

## 2024-06-10 PROCEDURE — 99211 OFF/OP EST MAY X REQ PHY/QHP: CPT

## 2024-06-10 PROCEDURE — 85610 PROTHROMBIN TIME: CPT

## 2024-06-10 NOTE — PROGRESS NOTES
Medication Management Service  [facility]  [phone]    Visit Date: 6/10/2024   Subjective:       Rah Andino is a 33 y.o. male who presents to clinic today for anticoagulation monitoring and adjustment.    Patient seen in clinic for warfarin management due to  Indication:   mechanical aortic valve.   INR goal: of 2.0-3.0.  Duration of therapy: indefinite.    Patient reports the following:   Adherent with regimen  Missed or extra doses:  None   Bleeding or thromboembolic side effects:  None  Significant medication changes:  zpack ended yesterday  Significant dietary changes: protein shakes restarted today  Significant alcohol or tobacco changes: None  Significant recent illness, disease state changes, or hospitalization:  None  Upcoming surgeries or procedures:  None  Falls: None           Assessment and Plan     PT/INR done in office per protocol.   INR today is 2.1, therapeutic.        [facility] GI 6/13  Plan:  Will continue current regimen of warfarin 7.5mg daily except 10mg on Mondays.     Recheck INR in 1 week(s).     Patient verbalized understanding of dosing directions and information discussed. Dosing schedule given to patient including phone number, appointment date, and time. Progress note sent to referring office.    Electronically signed by Eloise Hamm RPH on 6/10/24     For Pharmacy Admin Tracking Only    Intervention Detail:   Total # of Interventions Recommended:   Total # of Interventions Accepted:   Time Spent (min): 15

## 2024-06-17 ENCOUNTER — TELEPHONE (OUTPATIENT)
Dept: PHARMACY | Age: 34
End: 2024-06-17

## 2024-06-17 ENCOUNTER — ANTI-COAG VISIT (OUTPATIENT)
Dept: PHARMACY | Age: 34
End: 2024-06-17
Payer: MEDICARE

## 2024-06-17 DIAGNOSIS — I42.2 HYPERTROPHIC CARDIOMYOPATHY (HCC): Primary | ICD-10-CM

## 2024-06-17 DIAGNOSIS — Z95.2 S/P AVR: ICD-10-CM

## 2024-06-17 LAB
INTERNATIONAL NORMALIZATION RATIO, POC: 2.4
POC INR: 2.4 INDEX
PROTHROMBIN TIME, POC: 28.2 SECONDS (ref 10–14.3)

## 2024-06-17 PROCEDURE — 85610 PROTHROMBIN TIME: CPT

## 2024-06-17 PROCEDURE — 99211 OFF/OP EST MAY X REQ PHY/QHP: CPT

## 2024-06-17 PROCEDURE — 36416 COLLJ CAPILLARY BLOOD SPEC: CPT

## 2024-06-17 NOTE — TELEPHONE ENCOUNTER
Rescheduled for 4:30pm today from 11:30am.     For Pharmacy Admin Tracking Only    Intervention Detail:   Total # of Interventions Recommended:   Total # of Interventions Accepted:   Time Spent (min): 5

## 2024-06-17 NOTE — PROGRESS NOTES
Medication Management Service  [facility]  [phone]    Visit Date: 6/17/2024   Subjective:       Rah Andino is a 33 y.o. male who presents to clinic today for anticoagulation monitoring and adjustment.    Patient seen in clinic for warfarin management due to  Indication:   mechanical aortic valve.   INR goal: of 2.0-3.0.  Duration of therapy: indefinite.    Patient reports the following:   Adherent with regimen  Missed or extra doses:  None   Bleeding or thromboembolic side effects:  None  Significant medication changes:  None  Significant dietary changes: None  Significant alcohol or tobacco changes: None  Significant recent illness, disease state changes, or hospitalization:  None  Upcoming surgeries or procedures:  None  Falls: None           Assessment and Plan     PT/INR done in office per protocol.   INR today is 2.4, therapeutic.          Plan:  Will continue current regimen of warfarin 7.5mg daily except 10mg on Mondays.     Recheck INR in 3 weeks     Patient verbalized understanding of dosing directions and information discussed. Dosing schedule given to patient including phone number, appointment date, and time. Progress note sent to referring office.    Electronically signed by Eloise Hamm RPH on 6/17/24     For Pharmacy Admin Tracking Only    Intervention Detail:   Total # of Interventions Recommended:   Total # of Interventions Accepted:   Time Spent (min): 15

## 2024-06-24 ENCOUNTER — TELEPHONE (OUTPATIENT)
Dept: PHARMACY | Age: 34
End: 2024-06-24

## 2024-06-24 NOTE — TELEPHONE ENCOUNTER
Procedure 7/25 and will need Lovenox bridge.     Next appointment 7/8/24.     Spoke to patient, he wasn't given directions on how long to hold warfarin prior to procedure.    [phone]  8am-5pm     Left message requesting call back to confirm how long patient is to hold warfarin prior to procedure.     For Pharmacy Admin Tracking Only    Intervention Detail:   Total # of Interventions Recommended:   Total # of Interventions Accepted:   Time Spent (min): 10

## 2024-07-08 ENCOUNTER — ANTI-COAG VISIT (OUTPATIENT)
Dept: PHARMACY | Age: 34
End: 2024-07-08
Payer: MEDICARE

## 2024-07-08 DIAGNOSIS — Z95.2 S/P AVR: ICD-10-CM

## 2024-07-08 DIAGNOSIS — I42.2 HYPERTROPHIC CARDIOMYOPATHY (HCC): Primary | ICD-10-CM

## 2024-07-08 LAB
INTERNATIONAL NORMALIZATION RATIO, POC: 2.7
POC INR: 2.7 INDEX
PROTHROMBIN TIME, POC: 32.1
PROTHROMBIN TIME, POC: 32.1 SECONDS (ref 10–14.3)

## 2024-07-08 PROCEDURE — 36416 COLLJ CAPILLARY BLOOD SPEC: CPT

## 2024-07-08 PROCEDURE — 85610 PROTHROMBIN TIME: CPT

## 2024-07-08 PROCEDURE — 99213 OFFICE O/P EST LOW 20 MIN: CPT

## 2024-07-08 RX ORDER — ENOXAPARIN SODIUM 100 MG/ML
100 INJECTION SUBCUTANEOUS 2 TIMES DAILY
Qty: 20 EACH | Refills: 1 | Status: SHIPPED | OUTPATIENT
Start: 2024-07-08

## 2024-07-08 RX ORDER — ENOXAPARIN SODIUM 100 MG/ML
90 INJECTION SUBCUTANEOUS 2 TIMES DAILY
Qty: 20 EACH | Refills: 1 | OUTPATIENT
Start: 2024-07-08 | End: 2024-07-08

## 2024-07-08 NOTE — PROGRESS NOTES
Medication Management Service  [facility]  [phone]    Visit Date: 7/8/2024   Subjective:       Rah Andino is a 33 y.o. male who presents to clinic today for anticoagulation monitoring and adjustment.    Patient seen in clinic for warfarin management due to  Indication:   mechanical aortic valve.   INR goal: of 2.0-3.0.  Duration of therapy: indefinite.    Patient reports the following:   Adherent with regimen  Missed or extra doses:  missed dose last night  Bleeding or thromboembolic side effects:  None  Significant medication changes:  None  Significant dietary changes: egg roll, continues protein shakes  Significant alcohol or tobacco changes: None  Significant recent illness, disease state changes, or hospitalization:  None  Upcoming surgeries or procedures:  None  Falls: None           Assessment and Plan     PT/INR done in office per protocol.   INR today is 2.7, therapeutic.          Plan:  Will continue current regimen of warfarin 7.5mg daily except 10mg on Mondays.       Patient's procedure scheduled for 7/25/24. Hold warfarin for 5 days prior to procedure. Last dose of warfarin on 7/19/24.  Bridge with lovenox at 1mg/kg. Patient reported weight is 208lb, 94.5 kg. Start Lovenox 100mg twice daily on 7/22/24. Last dose of Lovenox prior to procedure in AM on 7/24. Restart warfarin and Lovenox following procedure at direction of physician performing procedure. Continue Lovenox 100mg twice daily and warfarin 10mg daily for 2 days then 7.5mg daily except 10mg on Mondays until INR therapeutic.   Lovenox called to [facility] [place] voicemail.     Recheck INR in 3 week(s).     Patient verbalized understanding of dosing directions and information discussed. Dosing schedule given to patient including phone number, appointment date, and time. Progress note sent to referring office.    Electronically signed by Eloise Hamm RPH on 7/8/24     For Pharmacy Admin Tracking

## 2024-07-29 ENCOUNTER — ANTI-COAG VISIT (OUTPATIENT)
Dept: PHARMACY | Age: 34
End: 2024-07-29
Payer: MEDICARE

## 2024-07-29 DIAGNOSIS — Z95.2 S/P AVR: ICD-10-CM

## 2024-07-29 DIAGNOSIS — I42.2 HYPERTROPHIC CARDIOMYOPATHY (HCC): Primary | ICD-10-CM

## 2024-07-29 LAB
INTERNATIONAL NORMALIZATION RATIO, POC: 1.8
POC INR: 1.8 INDEX
PROTHROMBIN TIME, POC: 21.4 SECONDS (ref 10–14.3)
PROTHROMBIN TIME, POC: NORMAL

## 2024-07-29 PROCEDURE — 85610 PROTHROMBIN TIME: CPT

## 2024-07-29 PROCEDURE — 99212 OFFICE O/P EST SF 10 MIN: CPT

## 2024-07-29 NOTE — PROGRESS NOTES
Medication Management Service  [facility]  [phone]    Visit Date: 7/29/2024   Subjective:       Rah Andino is a 33 y.o. male who presents to clinic today for anticoagulation monitoring and adjustment.    Patient seen in clinic for warfarin management due to  Indication:   mechanical aortic valve.   INR goal: of 2.0-3.0.  Duration of therapy: indefinite.    Patient reports the following:   Adherent with regimen  Missed or extra doses:  None   Bleeding or thromboembolic side effects:  None  Significant medication changes:  e  Significant dietary changes: eating less  Significant alcohol or tobacco changes: None  Significant recent illness, disease state changes, or hospitalization:  sinus infection  Upcoming surgeries or procedures:  None  Falls: None           Assessment and Plan     PT/INR done in office per protocol.   INR today is 1.8, subtherapeutic.          Plan:  Continue Lovenox twice daily until INR in range. Take warfarin 10mg daily for 2 days then  will continue current regimen of warfarin 7.5mg daily except 10mg on Mondays.     Recheck INR in 3 days     Patient verbalized understanding of dosing directions and information discussed. Dosing schedule given to patient including phone number, appointment date, and time. Progress note sent to referring office.    Electronically signed by Eloise Hamm RPH on 7/29/24     For Pharmacy Admin Tracking Only    Intervention Detail: Dose Adjustment: 1, reason: Therapy Optimization  Total # of Interventions Recommended: 1  Total # of Interventions Accepted: 1  Time Spent (min): 15

## 2024-08-01 ENCOUNTER — ANTI-COAG VISIT (OUTPATIENT)
Dept: PHARMACY | Age: 34
End: 2024-08-01
Payer: MEDICARE

## 2024-08-01 DIAGNOSIS — Z95.2 S/P AVR: ICD-10-CM

## 2024-08-01 DIAGNOSIS — I42.2 HYPERTROPHIC CARDIOMYOPATHY (HCC): Primary | ICD-10-CM

## 2024-08-01 LAB
INTERNATIONAL NORMALIZATION RATIO, POC: 2.3
POC INR: 2.3 INDEX
PROTHROMBIN TIME, POC: 27
PROTHROMBIN TIME, POC: 27 SECONDS (ref 10–14.3)

## 2024-08-01 PROCEDURE — 85610 PROTHROMBIN TIME: CPT

## 2024-08-01 PROCEDURE — 99211 OFF/OP EST MAY X REQ PHY/QHP: CPT

## 2024-08-01 NOTE — PROGRESS NOTES
Medication Management Service  [facility]  [phone]    Visit Date: 8/1/2024   Subjective:       Rah Andino is a 33 y.o. male who presents to clinic today for anticoagulation monitoring and adjustment.    Patient seen in clinic for warfarin management due to  Indication:   mechanical aortic valve.   INR goal: of 2.0-3.0.  Duration of therapy: indefinite.    Patient reports the following:   Adherent with regimen  Missed or extra doses:  None   Bleeding or thromboembolic side effects:  None  Significant medication changes:  None  Significant dietary changes: None  Significant alcohol or tobacco changes: None  Significant recent illness, disease state changes, or hospitalization:  None  Upcoming surgeries or procedures:  not scheduled yet- with [facility] electrophysiology   Falls: None           Assessment and Plan     PT/INR done in office per protocol.   INR today is 2.3, therapeutic.          Plan:  Stop Lovenox. Will continue current regimen of warfarin 7.5mg daily except 10mg on Mondays.     Recheck INR in 1-2 week(s)- will schedule him at his mom's next visit in 4 days.      Patient verbalized understanding of dosing directions and information discussed. Dosing schedule given to patient including phone number, appointment date, and time. Progress note sent to referring office.    Electronically signed by Eloise Hamm RPH on 8/1/24     For Pharmacy Admin Tracking Only    Intervention Detail:   Total # of Interventions Recommended:   Total # of Interventions Accepted:   Time Spent (min): 15     patient

## 2024-08-15 ENCOUNTER — TELEPHONE (OUTPATIENT)
Dept: PHARMACY | Age: 34
End: 2024-08-15

## 2024-08-19 ENCOUNTER — TELEPHONE (OUTPATIENT)
Dept: PHARMACY | Age: 34
End: 2024-08-19

## 2024-08-19 NOTE — TELEPHONE ENCOUNTER
Rescheduled from today to 8/22.     For Pharmacy Admin Tracking Only    Intervention Detail:   Total # of Interventions Recommended:   Total # of Interventions Accepted:   Time Spent (min): 5

## 2024-08-22 ENCOUNTER — ANTI-COAG VISIT (OUTPATIENT)
Dept: PHARMACY | Age: 34
End: 2024-08-22
Payer: MEDICARE

## 2024-08-22 DIAGNOSIS — I42.2 HYPERTROPHIC CARDIOMYOPATHY (HCC): Primary | ICD-10-CM

## 2024-08-22 DIAGNOSIS — Z95.2 S/P AVR: ICD-10-CM

## 2024-08-22 LAB
INTERNATIONAL NORMALIZATION RATIO, POC: 2.6
POC INR: 2.6 INDEX
PROTHROMBIN TIME, POC: 31.4
PROTHROMBIN TIME, POC: 31.4 SECONDS (ref 10–14.3)

## 2024-08-22 PROCEDURE — 85610 PROTHROMBIN TIME: CPT

## 2024-08-22 PROCEDURE — 99211 OFF/OP EST MAY X REQ PHY/QHP: CPT

## 2024-08-22 NOTE — PROGRESS NOTES
Medication Management Service  [facility]  [phone]    Visit Date: 8/22/2024   Subjective:       Rah Andino is a 33 y.o. male who presents to clinic today for anticoagulation monitoring and adjustment.    Patient seen in clinic for warfarin management due to  Indication:   mechanical aortic valve.   INR goal: of 2.0-3.0.  Duration of therapy: indefinite.    Patient reports the following:   Adherent with regimen  Missed or extra doses:  None   Bleeding or thromboembolic side effects:  None  Significant medication changes:  None  Significant dietary changes: None  Significant alcohol or tobacco changes: None  Significant recent illness, disease state changes, or hospitalization:  None  Upcoming surgeries or procedures:  [facility] 9/25/24  Falls: None           Assessment and Plan     PT/INR done in office per protocol.   INR today is 2.6, therapeutic.        Has been taking 10mg on Fridays instead of Mondays, will keep on Fridays   Plan:  Will continue current regimen of warfarin 7.5mg daily except 10mg on Fridays  Recheck INR in 3.5 week(s).     Patient verbalized understanding of dosing directions and information discussed. Dosing schedule given to patient including phone number, appointment date, and time. Progress note sent to referring office.    Electronically signed by Eloise Hamm RPH on 8/22/24     For Pharmacy Admin Tracking Only    Intervention Detail:   Total # of Interventions Recommended:   Total # of Interventions Accepted:   Time Spent (min): 15

## 2024-09-05 ENCOUNTER — TELEPHONE (OUTPATIENT)
Dept: PHARMACY | Age: 34
End: 2024-09-05

## 2024-09-05 DIAGNOSIS — Z95.2 S/P AVR: ICD-10-CM

## 2024-09-05 DIAGNOSIS — I42.2 HYPERTROPHIC CARDIOMYOPATHY (HCC): Primary | ICD-10-CM

## 2024-09-05 NOTE — TELEPHONE ENCOUNTER
Ida called, patient starting on Bactrim from PCP.     sulfamethoxazole-trimethoprim (BACTRIM DS) 800-160 mg per tablet   Indications: Cellulitis of right lower extremity Take 1 tablet by mouth in the morning and 1 tablet before bedtime. Do all this for 7 days. 14 tablet    09/05/2024 09/12/2024       Called patient. Scheduled for 9/9/24.     Bactrim and warfarin: major interaction.   Will empirically reduce dose due to known interaction.     Instructed patient to take warfarin 7.5mg today then take warfarin 5 mg on 9/6, 3.75mg on 9/7 and 9/8.   Patient voices understanding.     For Pharmacy Admin Tracking Only    Intervention Detail: Dose Adjustment: 1, reason: Therapy De-escalation  Total # of Interventions Recommended: 1  Total # of Interventions Accepted: 1  Time Spent (min): 15

## 2024-09-09 ENCOUNTER — ANTI-COAG VISIT (OUTPATIENT)
Dept: PHARMACY | Age: 34
End: 2024-09-09
Payer: MEDICARE

## 2024-09-09 DIAGNOSIS — I42.2 HYPERTROPHIC CARDIOMYOPATHY (HCC): Primary | ICD-10-CM

## 2024-09-09 DIAGNOSIS — Z95.2 S/P AVR: ICD-10-CM

## 2024-09-09 LAB
INTERNATIONAL NORMALIZATION RATIO, POC: 2.6
POC INR: 2.6 (ref 0.9–1.2)
PROTHROMBIN TIME, POC: 31
PROTHROMBIN TIME, POC: 31 SEC (ref 10–14.3)

## 2024-09-09 PROCEDURE — 99212 OFFICE O/P EST SF 10 MIN: CPT

## 2024-09-09 PROCEDURE — 85610 PROTHROMBIN TIME: CPT

## 2024-09-16 ENCOUNTER — ANTI-COAG VISIT (OUTPATIENT)
Dept: PHARMACY | Age: 34
End: 2024-09-16
Payer: MEDICARE

## 2024-09-16 DIAGNOSIS — Z95.2 S/P AVR: ICD-10-CM

## 2024-09-16 DIAGNOSIS — I42.2 HYPERTROPHIC CARDIOMYOPATHY (HCC): Primary | ICD-10-CM

## 2024-09-16 LAB
INTERNATIONAL NORMALIZATION RATIO, POC: 3.9
POC INR: 3.9 (ref 0.9–1.2)
PROTHROMBIN TIME, POC: 47.1
PROTHROMBIN TIME, POC: 47.1 SEC (ref 10–14.3)

## 2024-09-16 PROCEDURE — 99212 OFFICE O/P EST SF 10 MIN: CPT

## 2024-09-16 PROCEDURE — 85610 PROTHROMBIN TIME: CPT

## 2024-09-16 RX ORDER — ENOXAPARIN SODIUM 100 MG/ML
100 INJECTION SUBCUTANEOUS EVERY 12 HOURS
COMMUNITY
Start: 2024-09-11 | End: 2024-09-18

## 2024-09-26 ENCOUNTER — TELEPHONE (OUTPATIENT)
Dept: PHARMACY | Age: 34
End: 2024-09-26

## 2024-09-26 DIAGNOSIS — Z95.2 S/P AVR: ICD-10-CM

## 2024-09-26 DIAGNOSIS — I42.2 HYPERTROPHIC CARDIOMYOPATHY (HCC): Primary | ICD-10-CM

## 2024-09-30 ENCOUNTER — ANTI-COAG VISIT (OUTPATIENT)
Dept: PHARMACY | Age: 34
End: 2024-09-30
Payer: MEDICARE

## 2024-09-30 DIAGNOSIS — Z95.2 S/P AVR: ICD-10-CM

## 2024-09-30 DIAGNOSIS — I42.2 HYPERTROPHIC CARDIOMYOPATHY (HCC): Primary | ICD-10-CM

## 2024-09-30 LAB
INTERNATIONAL NORMALIZATION RATIO, POC: 1.7
POC INR: 1.7 (ref 0.9–1.2)
PROTHROMBIN TIME, POC: 20.7
PROTHROMBIN TIME, POC: 20.7 SEC (ref 10–14.3)

## 2024-09-30 PROCEDURE — 99213 OFFICE O/P EST LOW 20 MIN: CPT

## 2024-09-30 PROCEDURE — 85610 PROTHROMBIN TIME: CPT

## 2024-09-30 RX ORDER — WARFARIN SODIUM 7.5 MG/1
7.5 TABLET ORAL DAILY
Qty: 30 TABLET | Refills: 2 | OUTPATIENT
Start: 2024-09-30

## 2024-09-30 NOTE — PROGRESS NOTES
Medication Management Service  [facility]  [phone]    Visit Date: 9/30/2024   Subjective:       Rah Andino is a 33 y.o. male who presents to clinic today for anticoagulation monitoring and adjustment.    Patient seen in clinic for warfarin management due to  Indication:   mechanical aortic valve.   INR goal: of 2.0-3.0.  Duration of therapy: indefinite.    Patient reports the following:   Adherent with regimen  Missed or extra doses:  None   Bleeding or thromboembolic side effects:  None  Significant medication changes:  None  Significant dietary changes: eating less overall since surgery  Significant alcohol or tobacco changes: None  Significant recent illness, disease state changes, or hospitalization:  [facility] 9/25-26 ICD lead added  Upcoming surgeries or procedures:  None  Falls: None           Assessment and Plan     PT/INR done in office per protocol.   INR today is 1.7, subtherapeutic.        Refill needed  Plan:  Continue Lovenox twice daily until INR therapeutic. Will continue current regimen of warfarin 7.5mg daily.    Refill called to [facility] voiceamail   Recheck INR in 3 days    Patient verbalized understanding of dosing directions and information discussed. Dosing schedule given to patient including phone number, appointment date, and time. Progress note sent to referring office.    Electronically signed by Eloise Hamm RPH on 9/30/24     For Pharmacy Admin Tracking Only    Intervention Detail: Dose Adjustment: 1, reason: Therapy Optimization and Refill(s) Provided  Total # of Interventions Recommended: 2  Total # of Interventions Accepted: 2  Time Spent (min): 15

## 2024-10-03 ENCOUNTER — ANTI-COAG VISIT (OUTPATIENT)
Dept: PHARMACY | Age: 34
End: 2024-10-03
Payer: MEDICARE

## 2024-10-03 DIAGNOSIS — Z95.2 S/P AVR: ICD-10-CM

## 2024-10-03 DIAGNOSIS — I42.2 HYPERTROPHIC CARDIOMYOPATHY (HCC): Primary | ICD-10-CM

## 2024-10-03 LAB
INTERNATIONAL NORMALIZATION RATIO, POC: 1.5
POC INR: 1.5 (ref 0.9–1.2)
PROTHROMBIN TIME, POC: 17.4
PROTHROMBIN TIME, POC: 17.4 SEC (ref 10–14.3)

## 2024-10-03 PROCEDURE — 85610 PROTHROMBIN TIME: CPT

## 2024-10-03 PROCEDURE — 99213 OFFICE O/P EST LOW 20 MIN: CPT

## 2024-10-03 RX ORDER — ENOXAPARIN SODIUM 100 MG/ML
100 INJECTION SUBCUTANEOUS 2 TIMES DAILY
Qty: 10 ML | Refills: 1 | OUTPATIENT
Start: 2024-10-03

## 2024-10-03 NOTE — PROGRESS NOTES
Medication Management Service  [facility]  [phone]    Visit Date: 10/3/2024   Subjective:       Rah Andino is a 33 y.o. male who presents to clinic today for anticoagulation monitoring and adjustment.    Patient seen in clinic for warfarin management due to  Indication:   mechanical aortic valve.   INR goal: of 2.0-3.0.  Duration of therapy: indefinite.    Patient reports the following:   Adherent with regimen  Missed or extra doses:  None   Bleeding or thromboembolic side effects:  None  Significant medication changes:  None  Significant dietary changes:appetite increased   Significant alcohol or tobacco changes: None  Significant recent illness, disease state changes, or hospitalization:  None  Upcoming surgeries or procedures:  None  Falls: None           Assessment and Plan     PT/INR done in office per protocol.   INR today is 1.5, subtherapeutic.        210-215lb  Refill needed Lovenox  Plan: Take warfarin 10mg daily for 2 days then will continue current regimen of warfarin 7.5mg daily except 10mg on Fridays.   Continue Lovenox 100mg every 12 hours until INR therapeutic  Refill Lovenox called to [facility]   Recheck INR in 4 days     Patient verbalized understanding of dosing directions and information discussed. Dosing schedule given to patient including phone number, appointment date, and time. Progress note sent to referring office.    Electronically signed by Eloise Hamm RPH on 10/3/24     For Pharmacy Admin Tracking Only    Intervention Detail: Dose Adjustment: 1, reason: Therapy Optimization and Refill(s) Provided  Total # of Interventions Recommended: 2  Total # of Interventions Accepted: 2  Time Spent (min): 15

## 2024-10-04 NOTE — PROGRESS NOTES
Medication Management Service  [facility]  [phone]    Visit Date: 10/7/2024   Subjective:       Rah Andino is a 33 y.o. male who presents to clinic today for anticoagulation monitoring and adjustment.    Patient seen in clinic for warfarin management due to  Indication:   mechanical aortic valve.   INR goal: of 2.0-3.0.  Duration of therapy: indefinite.    Patient reports the following:   Adherent with regimen  Missed or extra doses:  None   Bleeding or thromboembolic side effects:  None  Significant medication changes:  None  Significant dietary changes: None  Significant alcohol or tobacco changes: None  Significant recent illness, disease state changes, or hospitalization:  None  Upcoming surgeries or procedures:  None  Falls: None           Assessment and Plan     PT/INR done in office per protocol.   INR today is 2.3, therapeutic.          Plan:  Stop Lovenox. Will continue current regimen of warfarin 7.5mg daily except 10mg on Fridays.     Recheck INR in 1.5 week(s).     Patient verbalized understanding of dosing directions and information discussed. Dosing schedule given to patient including phone number, appointment date, and time. Progress note sent to referring office.    Electronically signed by Eloise Hamm RPH on 10/4/24     For Pharmacy Admin Tracking Only    Intervention Detail: Dose Adjustment: 1, reason: Therapy Optimization  Total # of Interventions Recommended: 1  Total # of Interventions Accepted: 1  Time Spent (min): 15

## 2024-10-07 ENCOUNTER — ANTI-COAG VISIT (OUTPATIENT)
Dept: PHARMACY | Age: 34
End: 2024-10-07
Payer: MEDICARE

## 2024-10-07 DIAGNOSIS — Z95.2 S/P AVR: ICD-10-CM

## 2024-10-07 DIAGNOSIS — I42.2 HYPERTROPHIC CARDIOMYOPATHY (HCC): Primary | ICD-10-CM

## 2024-10-07 LAB
INTERNATIONAL NORMALIZATION RATIO, POC: 2.3
POC INR: 2.3 (ref 0.9–1.2)
PROTHROMBIN TIME, POC: 27.2
PROTHROMBIN TIME, POC: 27.2 SEC (ref 10–14.3)

## 2024-10-07 PROCEDURE — 85610 PROTHROMBIN TIME: CPT

## 2024-10-07 PROCEDURE — 99212 OFFICE O/P EST SF 10 MIN: CPT

## 2024-10-16 NOTE — PROGRESS NOTES
Medication Management Service  [facility]  [phone]    Visit Date: 10/17/2024   Subjective:       Rah Andino is a 33 y.o. male who presents to clinic today for anticoagulation monitoring and adjustment.    Patient seen in clinic for warfarin management due to  Indication:   mechanical aortic valve.   INR goal: of 2.0-3.0.  Duration of therapy: indefinite.    Patient reports the following:   Adherent with regimen  Missed or extra doses:  None   Bleeding or thromboembolic side effects:  None  Significant medication changes:  Dayquil today  Significant dietary changes: None  Significant alcohol or tobacco changes: None  Significant recent illness, disease state changes, or hospitalization:    Upcoming surgeries or procedures:  None  Falls: None           Assessment and Plan     PT/INR done in office per protocol.   INR today is 3.0, therapeutic.          Plan:  Will continue current regimen of warfarin 7.5mg daily except 10mg on Fridays.     Recheck INR in 3.5 week(s).     Patient verbalized understanding of dosing directions and information discussed. Dosing schedule given to patient including phone number, appointment date, and time. Progress note sent to referring office.    Electronically signed by Eloise Hamm RPH on 10/16/24     For Pharmacy Admin Tracking Only    Intervention Detail:   Total # of Interventions Recommended:   Total # of Interventions Accepted:   Time Spent (min): 15

## 2024-10-17 ENCOUNTER — ANTI-COAG VISIT (OUTPATIENT)
Dept: PHARMACY | Age: 34
End: 2024-10-17
Payer: MEDICARE

## 2024-10-17 DIAGNOSIS — Z95.2 S/P AVR: ICD-10-CM

## 2024-10-17 DIAGNOSIS — I42.2 HYPERTROPHIC CARDIOMYOPATHY (HCC): Primary | ICD-10-CM

## 2024-10-17 LAB
INTERNATIONAL NORMALIZATION RATIO, POC: 3
POC INR: 3 (ref 0.9–1.2)
PROTHROMBIN TIME, POC: 36.3
PROTHROMBIN TIME, POC: 36.3 SEC (ref 10–14.3)

## 2024-10-17 PROCEDURE — 99211 OFF/OP EST MAY X REQ PHY/QHP: CPT

## 2024-10-17 PROCEDURE — 85610 PROTHROMBIN TIME: CPT

## 2024-11-07 NOTE — PROGRESS NOTES
Medication Management Service  [facility]  [phone]    Visit Date: 11/11/2024   Subjective:       Rah Andino is a 34 y.o. male who presents to clinic today for anticoagulation monitoring and adjustment.    Patient seen in clinic for warfarin management due to  Indication:   mechanical aortic valve.   INR goal: of 2.0-3.0.  Duration of therapy: indefinite.    Patient reports the following:   Adherent with regimen  Missed or extra doses:  None   Bleeding or thromboembolic side effects:  None  Significant medication changes:  None  Significant dietary changes: None  Significant alcohol or tobacco changes: None  Significant recent illness, disease state changes, or hospitalization:  None  Upcoming surgeries or procedures:  None  Falls: None           Assessment and Plan     PT/INR done in office per protocol.   INR today is 2.7, therapeutic.        Refill needed on 5mg  Plan:  Will continue current regimen of warfarin 7.5mg daily except 10mg on Fridays.     Recheck INR in 4.5 week(s).     Patient verbalized understanding of dosing directions and information discussed. Dosing schedule given to patient including phone number, appointment date, and time. Progress note sent to referring office.    Electronically signed by Eloise Hamm RPH on 11/7/24     For Pharmacy Admin Tracking Only    Intervention Detail: Refill(s) Provided  Total # of Interventions Recommended: 1  Total # of Interventions Accepted: 1  Time Spent (min): 15

## 2024-11-11 ENCOUNTER — ANTI-COAG VISIT (OUTPATIENT)
Dept: PHARMACY | Age: 34
End: 2024-11-11
Payer: MEDICARE

## 2024-11-11 DIAGNOSIS — Z95.2 S/P AVR: ICD-10-CM

## 2024-11-11 DIAGNOSIS — I42.2 HYPERTROPHIC CARDIOMYOPATHY (HCC): Primary | ICD-10-CM

## 2024-11-11 LAB
INTERNATIONAL NORMALIZATION RATIO, POC: 2.7
POC INR: 2.7 (ref 0.9–1.2)
PROTHROMBIN TIME, POC: 32
PROTHROMBIN TIME, POC: 32 SEC (ref 10–14.3)

## 2024-11-11 PROCEDURE — 85610 PROTHROMBIN TIME: CPT

## 2024-11-11 PROCEDURE — 99212 OFFICE O/P EST SF 10 MIN: CPT

## 2024-11-11 RX ORDER — WARFARIN SODIUM 5 MG/1
TABLET ORAL
Qty: 30 TABLET | Refills: 1 | OUTPATIENT
Start: 2024-11-11

## 2024-12-05 ENCOUNTER — ANTI-COAG VISIT (OUTPATIENT)
Dept: PHARMACY | Age: 34
End: 2024-12-05
Payer: MEDICARE

## 2024-12-05 DIAGNOSIS — Z95.2 S/P AVR: ICD-10-CM

## 2024-12-05 DIAGNOSIS — I42.2 HYPERTROPHIC CARDIOMYOPATHY (HCC): Primary | ICD-10-CM

## 2024-12-05 LAB
INTERNATIONAL NORMALIZATION RATIO, POC: 3.8
PROTHROMBIN TIME, POC: 46

## 2024-12-05 PROCEDURE — 99213 OFFICE O/P EST LOW 20 MIN: CPT

## 2024-12-05 RX ORDER — WARFARIN SODIUM 7.5 MG/1
7.5 TABLET ORAL DAILY
Qty: 30 TABLET | Refills: 2 | OUTPATIENT
Start: 2024-12-05 | End: 2024-12-05

## 2024-12-05 RX ORDER — WARFARIN SODIUM 7.5 MG/1
7.5 TABLET ORAL DAILY
Qty: 90 TABLET | Refills: 1 | OUTPATIENT
Start: 2024-12-05

## 2024-12-05 NOTE — PROGRESS NOTES
Medication Management Service  [facility]  [phone]    Visit Date: 12/5/2024   Subjective:       Rah Andino is a 34 y.o. male who presents to clinic today for anticoagulation monitoring and adjustment.    Patient seen in clinic for warfarin management due to  Indication:   mechanical aortic valve.   INR goal: of 2.0-3.0.  Duration of therapy: indefinite.    Patient reports the following:   Adherent with regimen  Missed or extra doses:  None   Bleeding or thromboembolic side effects:  None  Significant medication changes:  z pack finished last week on Monday  Significant dietary changes: less vitamin K overall- break from protein drinks but plans to resume  Significant alcohol or tobacco changes: None  Significant recent illness, disease state changes, or hospitalization:  recent illness  Upcoming surgeries or procedures:  None  Falls: None           Assessment and Plan     PT/INR done in office per protocol.   INR today is 3.8, supratherapeutic.        Refill needed 7.5mg [facility]   Plan: Hold warfarin today then  will continue current regimen of warfarin 7.5mg daily except 10mg on Fridays   Refill called to [facility] voicemail  Recheck INR in 1 week(s).     Patient verbalized understanding of dosing directions and information discussed. Dosing schedule given to patient including phone number, appointment date, and time. Progress note sent to referring office.    Electronically signed by Eloise Hamm RPH on 12/5/24     For Pharmacy Admin Tracking Only    Intervention Detail: Dose Adjustment: 1, reason: Therapy De-escalation and Refill(s) Provided  Total # of Interventions Recommended: 2  Total # of Interventions Accepted: 2  Time Spent (min): 15

## 2024-12-09 LAB
POC INR: 3.8 (ref 0.9–1.2)
PROTHROMBIN TIME, POC: 46 SEC (ref 10–14.3)

## 2024-12-12 ENCOUNTER — ANTI-COAG VISIT (OUTPATIENT)
Dept: PHARMACY | Age: 34
End: 2024-12-12
Payer: MEDICARE

## 2024-12-12 DIAGNOSIS — Z95.2 S/P AVR: ICD-10-CM

## 2024-12-12 DIAGNOSIS — I42.2 HYPERTROPHIC CARDIOMYOPATHY (HCC): Primary | ICD-10-CM

## 2024-12-12 LAB
INTERNATIONAL NORMALIZATION RATIO, POC: 1.8
POC INR: 1.8 (ref 0.9–1.2)
PROTHROMBIN TIME, POC: 21.6
PROTHROMBIN TIME, POC: 21.6 SEC (ref 10–14.3)

## 2024-12-12 PROCEDURE — 99212 OFFICE O/P EST SF 10 MIN: CPT

## 2024-12-12 PROCEDURE — 85610 PROTHROMBIN TIME: CPT

## 2024-12-12 NOTE — PROGRESS NOTES
Medication Management Service  [facility]  [phone]    Visit Date: 12/12/2024   Subjective:       Rah Andino is a 34 y.o. male who presents to clinic today for anticoagulation monitoring and adjustment.    Patient seen in clinic for warfarin management due to  Indication:   mechanical aortic valve.   INR goal: of 2.0-3.0.  Duration of therapy: indefinite.    Patient reports the following:   Adherent with regimen  Missed or extra doses:  None   Bleeding or thromboembolic side effects:  None  Significant medication changes:  None  Significant dietary changes: egg rolls last night  Significant alcohol or tobacco changes: None  Significant recent illness, disease state changes, or hospitalization:  None  Upcoming surgeries or procedures:  None  Falls: None           Assessment and Plan     PT/INR done in office per protocol.   INR today is 1.8, subtherapeutic.          Plan: Take warfarin 10mg today then  will continue current regimen of warfarin 7.5mg daily except 10mg on Fridays .     Recheck INR in 2 week(s).     Patient verbalized understanding of dosing directions and information discussed. Dosing schedule given to patient including phone number, appointment date, and time. Progress note sent to referring office.    Electronically signed by Eloise Hamm RPH on 12/12/24     For Pharmacy Admin Tracking Only    Intervention Detail: Dose Adjustment: 1, reason: Therapy Optimization  Total # of Interventions Recommended: 1  Total # of Interventions Accepted: 1  Time Spent (min): 15

## 2024-12-26 ENCOUNTER — ANTI-COAG VISIT (OUTPATIENT)
Dept: PHARMACY | Age: 34
End: 2024-12-26
Payer: MEDICARE

## 2024-12-26 DIAGNOSIS — I42.2 HYPERTROPHIC CARDIOMYOPATHY (HCC): Primary | ICD-10-CM

## 2024-12-26 DIAGNOSIS — Z95.2 S/P AVR: ICD-10-CM

## 2024-12-26 LAB
INTERNATIONAL NORMALIZATION RATIO, POC: 3.9
POC INR: 3.9 (ref 0.9–1.2)
PROTHROMBIN TIME, POC: 46.4
PROTHROMBIN TIME, POC: 46.4 SEC (ref 10–14.3)

## 2024-12-26 PROCEDURE — 85610 PROTHROMBIN TIME: CPT

## 2024-12-26 PROCEDURE — 99212 OFFICE O/P EST SF 10 MIN: CPT

## 2024-12-26 NOTE — PROGRESS NOTES
Medication Management Service  [facility]  [phone]    Visit Date: 12/26/2024   Subjective:       Rah Andino is a 34 y.o. male who presents to clinic today for anticoagulation monitoring and adjustment.    Patient seen in clinic for warfarin management due to  Indication:   mechanical aortic valve.   INR goal: of 2.0-3.0.  Duration of therapy: indefinite.    Patient reports the following:   Adherent with regimen  Missed or extra doses:  None   Bleeding or thromboembolic side effects:  None  Significant medication changes:  zofran, loperamide ordered from PCP for GI illness  Significant dietary changes: eating less during GI illness; had egg rolls and broccoli since last visit  Significant alcohol or tobacco changes: None  Significant recent illness, disease state changes, or hospitalization:  None  Upcoming surgeries or procedures:  None  Falls: None           Assessment and Plan     PT/INR done in office per protocol.   INR today is 3.9, supratherapeutic, likely due to recent GI illness      GI bug last week- diarrhea.  Saw PCP  Plan: Hold warfarin today then will continue current regimen of warfarin 7.5mg daily except 10mg on Fridays.     Recheck INR in 2.5 week(s).     Patient verbalized understanding of dosing directions and information discussed. Dosing schedule given to patient including phone number, appointment date, and time. Progress note sent to referring office.    Electronically signed by Eloise Hamm RPH on 12/26/24     For Pharmacy Admin Tracking Only    Intervention Detail: Dose Adjustment: 1, reason: Therapy De-escalation  Total # of Interventions Recommended: 1  Total # of Interventions Accepted: 1  Time Spent (min): 15

## 2025-01-10 NOTE — PROGRESS NOTES
Medication Management Service  [facility]  [phone]    Visit Date: 1/13/2025   Subjective:       Rah Andino is a 34 y.o. male who presents to clinic today for anticoagulation monitoring and adjustment.    Patient seen in clinic for warfarin management due to  Indication:   mechanical aortic valve and hypertrophic cardiomyopathy .   INR goal: of 2.0-3.0.  Duration of therapy: indefinite.    Patient reports the following:   Adherent with regimen  Missed or extra doses:  None   Bleeding or thromboembolic side effects:  None  Significant medication changes:  None  Significant dietary changes: seaweed and kale in ramen, had protein  Significant alcohol or tobacco changes: None  Significant recent illness, disease state changes, or hospitalization:  None  Upcoming surgeries or procedures:  None  Falls: None           Assessment and Plan     PT/INR done in office per protocol.   INR today is 3.3, supratherapeutic.          Plan:  Decrease weekly dose ~5% to warfarin 7.5mg daily     Recheck INR in 1.5 week(s).     Patient verbalized understanding of dosing directions and information discussed. Dosing schedule given to patient including phone number, appointment date, and time. Progress note sent to referring office.    Electronically signed by Eloise Hamm RPH on 1/10/25     For Pharmacy Admin Tracking Only    Intervention Detail: Dose Adjustment: 1, reason: Therapy De-escalation  Total # of Interventions Recommended: 1  Total # of Interventions Accepted: 1  Time Spent (min): 15     Sent to PSR, please call to mother and schedule 30 month well visit as per Dr. Johnson . Can come a little early. Will do vaccines that day. Maida Yanez RN 2/19/2020 9:23 AM

## 2025-01-13 ENCOUNTER — ANTI-COAG VISIT (OUTPATIENT)
Dept: PHARMACY | Age: 35
End: 2025-01-13
Payer: MEDICARE

## 2025-01-13 DIAGNOSIS — Z95.2 S/P AVR: ICD-10-CM

## 2025-01-13 DIAGNOSIS — I42.2 HYPERTROPHIC CARDIOMYOPATHY (HCC): Primary | ICD-10-CM

## 2025-01-13 LAB
INTERNATIONAL NORMALIZATION RATIO, POC: 3.3
POC INR: 3.3 (ref 0.9–1.2)
PROTHROMBIN TIME, POC: 40.1
PROTHROMBIN TIME, POC: 40.1 SEC (ref 10–14.3)

## 2025-01-13 PROCEDURE — 99211 OFF/OP EST MAY X REQ PHY/QHP: CPT

## 2025-01-13 PROCEDURE — 85610 PROTHROMBIN TIME: CPT

## 2025-01-21 NOTE — PROGRESS NOTES
Medication Management Service  [facility]  [phone]    Visit Date: 1/23/2025   Subjective:       Rah Andino is a 34 y.o. male who presents to clinic today for anticoagulation monitoring and adjustment.    Patient seen in clinic for warfarin management due to  Indication:   mechanical aortic valve and hypertrophic cardiomyopathy .   INR goal: of 2.0-3.0.  Duration of therapy: indefinite.    Patient reports the following:   Adherent with regimen  Missed or extra doses:  None   Bleeding or thromboembolic side effects:  None  Significant medication changes:  None  Significant dietary changes: None  Significant alcohol or tobacco changes: None  Significant recent illness, disease state changes, or hospitalization:  None  Upcoming surgeries or procedures:  None  Falls: None           Assessment and Plan     PT/INR done in office per protocol.   INR today is ***, {INR TYPE:3425851799}.          Plan:  Will continue current regimen of warfarin ***.     Recheck INR in *** week(s).     Patient verbalized understanding of dosing directions and information discussed. Dosing schedule given to patient including phone number, appointment date, and time. Progress note sent to referring office.    Electronically signed by Jacqueline Bauer RPH on 1/23/25     For Pharmacy Admin Tracking Only    Intervention Detail: {Anticoag Intervention Detail:07816}  Total # of Interventions Recommended: {Count:[id number]}  Total # of Interventions Accepted: {Count:[id number]}  Time Spent (min): {Time Spent:61128}

## 2025-01-23 ENCOUNTER — TELEPHONE (OUTPATIENT)
Dept: PHARMACY | Age: 35
End: 2025-01-23

## 2025-01-23 ENCOUNTER — APPOINTMENT (OUTPATIENT)
Dept: PHARMACY | Age: 35
End: 2025-01-23
Payer: MEDICARE

## 2025-01-23 NOTE — TELEPHONE ENCOUNTER
Patient left  stating his mother has tested positive for COVID and they both will not be coming to appointment today.    For Pharmacy Admin Tracking Only    Time Spent (min): 5

## 2025-01-23 NOTE — TELEPHONE ENCOUNTER
Returned call and rescheduled for 1/30    For Pharmacy Admin Tracking Only    Time Spent (min): 5

## 2025-01-30 ENCOUNTER — ANTI-COAG VISIT (OUTPATIENT)
Dept: PHARMACY | Age: 35
End: 2025-01-30
Payer: MEDICARE

## 2025-01-30 DIAGNOSIS — I42.2 HYPERTROPHIC CARDIOMYOPATHY (HCC): Primary | ICD-10-CM

## 2025-01-30 DIAGNOSIS — Z95.2 S/P AVR: ICD-10-CM

## 2025-01-30 LAB
INTERNATIONAL NORMALIZATION RATIO, POC: 4.7
POC INR: 4.7 (ref 0.9–1.2)
PROTHROMBIN TIME, POC: 56.5
PROTHROMBIN TIME, POC: 56.5 SEC (ref 10–14.3)

## 2025-01-30 PROCEDURE — 99212 OFFICE O/P EST SF 10 MIN: CPT

## 2025-01-30 PROCEDURE — 85610 PROTHROMBIN TIME: CPT

## 2025-01-30 NOTE — PROGRESS NOTES
Medication Management Service  [facility]  [phone]    Visit Date: 1/30/2025   Subjective:       Rah Andino is a 34 y.o. male who presents to clinic today for anticoagulation monitoring and adjustment.    Patient seen in clinic for warfarin management due to  Indication:   mechanical aortic valve.   INR goal: of 2.0-3.0.  Duration of therapy: indefinite.    Patient reports the following:   Adherent with regimen  Missed or extra doses:  None   Bleeding or thromboembolic side effects:  None  Significant medication changes:  doxycycline 100mg BID for 7 days, 5 doses in   Significant dietary changes: eating about 2 times a day instead of 3  Significant alcohol or tobacco changes: None  Significant recent illness, disease state changes, or hospitalization:  sore throat started about 1.5 weeks ago then sinus infection  Upcoming surgeries or procedures:  None  Falls: None           Assessment and Plan     PT/INR done in office per protocol.   INR today is 4.7, supratherapeutic.          Plan: Hold warfarin for 2 days will continue current regimen of warfarin 7.5mg.     Patient advised of increased risk of bleeding at current INR. Advised patient to avoid activities that increase risk of falling or cutting him/herself. Advised patient to go to ER for fall, head injury, or bleeding. Patient voiced understanding.     Recheck INR in 1 week(s).     Patient verbalized understanding of dosing directions and information discussed. Dosing schedule given to patient including phone number, appointment date, and time. Progress note sent to referring office.    Electronically signed by Eloise Hamm RPH on 1/30/25     For Pharmacy Admin Tracking Only    Intervention Detail: Dose Adjustment: 1, reason: Therapy De-escalation  Total # of Interventions Recommended: 1  Total # of Interventions Accepted: 1  Time Spent (min): 15

## 2025-02-10 ENCOUNTER — ANTI-COAG VISIT (OUTPATIENT)
Dept: PHARMACY | Age: 35
End: 2025-02-10
Payer: MEDICARE

## 2025-02-10 DIAGNOSIS — Z95.2 S/P AVR: ICD-10-CM

## 2025-02-10 DIAGNOSIS — I42.2 HYPERTROPHIC CARDIOMYOPATHY (HCC): Primary | ICD-10-CM

## 2025-02-10 LAB
INTERNATIONAL NORMALIZATION RATIO, POC: 3.2
POC INR: 3.2 (ref 0.9–1.2)
PROTHROMBIN TIME, POC: 38.6
PROTHROMBIN TIME, POC: 38.6 SEC (ref 10–14.3)

## 2025-02-10 PROCEDURE — 99212 OFFICE O/P EST SF 10 MIN: CPT

## 2025-02-10 PROCEDURE — 85610 PROTHROMBIN TIME: CPT

## 2025-02-10 NOTE — PROGRESS NOTES
Medication Management Service  [facility]  [phone]    Visit Date: 2/10/2025   Subjective:       Rah Andino is a 34 y.o. male who presents to clinic today for anticoagulation monitoring and adjustment.    Patient seen in clinic for warfarin management due to  Indication:   mechanical aortic valve and hypertrophic cardiomyopathy .   INR goal: of 2.0-3.0.  Duration of therapy: indefinite.    Patient reports the following:   Adherent with regimen  Missed or extra doses:  None   Bleeding or thromboembolic side effects:  None  Significant medication changes:  None  Significant dietary changes: eating less overall   Significant alcohol or tobacco changes: None  Significant recent illness, disease state changes, or hospitalization:  None  Upcoming surgeries or procedures:  None  Falls: None           Assessment and Plan     PT/INR done in office per protocol.   INR today is 3.2, supratherapeutic.          Plan: Decrease weekly dose ~5% to warfarin 7.5mg daily except 5mg on Mondays     Recheck INR in 1.5 week(s).     Patient verbalized understanding of dosing directions and information discussed. Dosing schedule given to patient including phone number, appointment date, and time. Progress note sent to referring office.    Electronically signed by Eloise Hamm RPH on 2/10/25     For Pharmacy Admin Tracking Only    Intervention Detail: Dose Adjustment: 1, reason: Therapy De-escalation  Total # of Interventions Recommended: 1  Total # of Interventions Accepted: 1  Time Spent (min): 15

## 2025-02-20 ENCOUNTER — ANTI-COAG VISIT (OUTPATIENT)
Dept: PHARMACY | Age: 35
End: 2025-02-20
Payer: MEDICARE

## 2025-02-20 DIAGNOSIS — I42.2 HYPERTROPHIC CARDIOMYOPATHY (HCC): Primary | ICD-10-CM

## 2025-02-20 DIAGNOSIS — Z95.2 S/P AVR: ICD-10-CM

## 2025-02-20 LAB
INTERNATIONAL NORMALIZATION RATIO, POC: 2.8
POC INR: 2.8 (ref 0.9–1.2)
PROTHROMBIN TIME, POC: 33.1
PROTHROMBIN TIME, POC: 33.1 SEC (ref 10–14.3)

## 2025-02-20 PROCEDURE — 85610 PROTHROMBIN TIME: CPT

## 2025-02-20 PROCEDURE — 99211 OFF/OP EST MAY X REQ PHY/QHP: CPT

## 2025-02-20 NOTE — PROGRESS NOTES
Medication Management Service  [facility]  [phone]    Visit Date: 2/20/2025   Subjective:       Rah Andino is a 34 y.o. male who presents to clinic today for anticoagulation monitoring and adjustment.    Patient seen in clinic for warfarin management due to  Indication:   mechanical aortic valve.   INR goal: of 2.0-3.0.  Duration of therapy: indefinite.    Patient reports the following:   Adherent with regimen  Missed or extra doses:  None   Bleeding or thromboembolic side effects:  None  Significant medication changes:  None  Significant dietary changes: eating more- kale and seaweed in last couple days  Significant alcohol or tobacco changes: None  Significant recent illness, disease state changes, or hospitalization:  None  Upcoming surgeries or procedures:  None  Falls: None           Assessment and Plan     PT/INR done in office per protocol.   INR today is 2.8, therapeutic.          Plan:  Will continue current regimen of warfarin 7.5mg daily except 5mg on Mondays.     Recheck INR in 3 week(s).     Patient verbalized understanding of dosing directions and information discussed. Dosing schedule given to patient including phone number, appointment date, and time. Progress note sent to referring office.    Electronically signed by Eloise Hamm RPH on 2/20/25     For Pharmacy Admin Tracking Only    Intervention Detail:   Total # of Interventions Recommended:   Total # of Interventions Accepted:   Time Spent (min): 15

## 2025-03-13 ENCOUNTER — ANTI-COAG VISIT (OUTPATIENT)
Dept: PHARMACY | Age: 35
End: 2025-03-13
Payer: MEDICARE

## 2025-03-13 DIAGNOSIS — Z95.2 S/P AVR: ICD-10-CM

## 2025-03-13 DIAGNOSIS — I42.2 HYPERTROPHIC CARDIOMYOPATHY (HCC): Primary | ICD-10-CM

## 2025-03-13 LAB
INTERNATIONAL NORMALIZATION RATIO, POC: 3.4
POC INR: 3.4 (ref 0.9–1.2)
PROTHROMBIN TIME, POC: 40.7
PROTHROMBIN TIME, POC: 40.7 SEC (ref 10–14.3)

## 2025-03-13 PROCEDURE — 99212 OFFICE O/P EST SF 10 MIN: CPT

## 2025-03-13 PROCEDURE — 85610 PROTHROMBIN TIME: CPT

## 2025-03-13 NOTE — PROGRESS NOTES
Medication Management Service  [facility]  [phone]    Visit Date: 3/13/2025   Subjective:       Rah Andino is a 34 y.o. male who presents to clinic today for anticoagulation monitoring and adjustment.    Patient seen in clinic for warfarin management due to  Indication:   atrial fibrillation.   INR goal: of 2.0-3.0.  Duration of therapy: indefinite.    Patient reports the following:   Adherent with regimen  Missed or extra doses:  None   Bleeding or thromboembolic side effects:  None  Significant medication changes:  None  Significant dietary changes: Not been taking protein supplements   Significant alcohol or tobacco changes: None  Significant recent illness, disease state changes, or hospitalization:  None  Upcoming surgeries or procedures:  None  Falls: None           Assessment and Plan     PT/INR done in office per protocol.   INR today is 3.4, supratherapeutic.        Plans to resume protein supplements. His wife and kids have GI illness over the weekend, but he was only nauseous for 1 day  Plan: Take warfarin 5mg today then will continue current regimen of warfarin 7.5mg daily except 5mg on Mondays  Recheck INR in 1.5 week(s).     Patient verbalized understanding of dosing directions and information discussed. Dosing schedule given to patient including phone number, appointment date, and time. Progress note sent to referring office.    Electronically signed by Eloise Hamm RPH on 3/13/25     For Pharmacy Admin Tracking Only    Intervention Detail: Dose Adjustment: 1, reason: Therapy De-escalation  Total # of Interventions Recommended: 1  Total # of Interventions Accepted: 1  Time Spent (min): 15    
Passed

## 2025-03-24 ENCOUNTER — ANTI-COAG VISIT (OUTPATIENT)
Dept: PHARMACY | Age: 35
End: 2025-03-24
Payer: MEDICARE

## 2025-03-24 DIAGNOSIS — I42.2 HYPERTROPHIC CARDIOMYOPATHY (HCC): Primary | ICD-10-CM

## 2025-03-24 DIAGNOSIS — Z95.2 S/P AVR: ICD-10-CM

## 2025-03-24 LAB
INTERNATIONAL NORMALIZATION RATIO, POC: 3.6
POC INR: 3.6 (ref 0.9–1.2)
PROTHROMBIN TIME, POC: 43.7
PROTHROMBIN TIME, POC: 43.7 SEC (ref 10–14.3)

## 2025-03-24 PROCEDURE — 85610 PROTHROMBIN TIME: CPT

## 2025-03-24 PROCEDURE — 99212 OFFICE O/P EST SF 10 MIN: CPT

## 2025-03-24 NOTE — PROGRESS NOTES
Medication Management Service  [facility]  [phone]    Visit Date: 3/24/2025   Subjective:       Rah Andino is a 34 y.o. male who presents to clinic today for anticoagulation monitoring and adjustment.    Patient seen in clinic for warfarin management due to  Indication:   mechanical aortic valve.   INR goal: of 2.0-3.0.  Duration of therapy: indefinite.    Patient reports the following:   Adherent with regimen  Missed or extra doses:  None   Bleeding or thromboembolic side effects:  None  Significant medication changes:  None  Significant dietary changes: little lower appetite  Significant alcohol or tobacco changes: None  Significant recent illness, disease state changes, or hospitalization:  None  Upcoming surgeries or procedures:  None  Falls: None           Assessment and Plan     PT/INR done in office per protocol.   INR today is 3.6, supratherapeutic.          Plan:  Hold warfarin today then decrease weekly dose 10% to warfarin 7.5mg daily except 5mg on MWF     Recheck INR in 2 week(s).     Patient verbalized understanding of dosing directions and information discussed. Dosing schedule given to patient including phone number, appointment date, and time. Progress note sent to referring office.    Electronically signed by Eloise Hamm RPH on 3/24/25     For Pharmacy Admin Tracking Only    Intervention Detail: Dose Adjustment: 1, reason: Therapy De-escalation  Total # of Interventions Recommended: 1  Total # of Interventions Accepted: 1  Time Spent (min): 15

## 2025-04-04 NOTE — PROGRESS NOTES
Medication Management Service  [facility]  [phone]    Visit Date: 4/7/2025   Subjective:       Rah Andino is a 34 y.o. male who presents to clinic today for anticoagulation monitoring and adjustment.    Patient seen in clinic for warfarin management due to  Indication:   mechanical aortic valve.   INR goal: of 2.0-3.0.  Duration of therapy: indefinite.    Patient reports the following:   Adherent with regimen  Missed or extra doses:  None   Bleeding or thromboembolic side effects:  None  Significant medication changes:  None  Significant dietary changes: atkins protein meal replacement bars each day for last ~week - plans to continue.   Significant alcohol or tobacco changes: None  Significant recent illness, disease state changes, or hospitalization:  None  Upcoming surgeries or procedures:  None  Falls: None           Assessment and Plan     PT/INR done in office per protocol.   INR today is 2.5, therapeutic.          Plan:  Will continue current regimen of warfarin 7.5mg daily except 5mg on MWF.     Recheck INR in 2 week(s).     Patient verbalized understanding of dosing directions and information discussed. Dosing schedule given to patient including phone number, appointment date, and time. Progress note sent to referring office.    Electronically signed by Eloise Hamm RPH on 4/4/25     For Pharmacy Admin Tracking Only    Intervention Detail:   Total # of Interventions Recommended:   Total # of Interventions Accepted:   Time Spent (min): 15

## 2025-04-07 ENCOUNTER — ANTI-COAG VISIT (OUTPATIENT)
Dept: PHARMACY | Age: 35
End: 2025-04-07
Payer: MEDICARE

## 2025-04-07 DIAGNOSIS — I42.2 HYPERTROPHIC CARDIOMYOPATHY (HCC): Primary | ICD-10-CM

## 2025-04-07 DIAGNOSIS — Z95.2 S/P AVR: ICD-10-CM

## 2025-04-07 LAB
INTERNATIONAL NORMALIZATION RATIO, POC: 2.5
POC INR: 2.5 (ref 0.9–1.2)
PROTHROMBIN TIME, POC: 30.6
PROTHROMBIN TIME, POC: 30.6 SEC (ref 10–14.3)

## 2025-04-07 PROCEDURE — 85610 PROTHROMBIN TIME: CPT

## 2025-04-07 PROCEDURE — 99211 OFF/OP EST MAY X REQ PHY/QHP: CPT

## 2025-04-21 ENCOUNTER — ANTI-COAG VISIT (OUTPATIENT)
Dept: PHARMACY | Age: 35
End: 2025-04-21
Payer: MEDICARE

## 2025-04-21 DIAGNOSIS — Z95.2 S/P AVR: ICD-10-CM

## 2025-04-21 DIAGNOSIS — I42.2 HYPERTROPHIC CARDIOMYOPATHY (HCC): Primary | ICD-10-CM

## 2025-04-21 LAB
INTERNATIONAL NORMALIZATION RATIO, POC: 2.9
POC INR: 2.9 (ref 0.9–1.2)
PROTHROMBIN TIME, POC: 34.7
PROTHROMBIN TIME, POC: 34.7 SEC (ref 10–14.3)

## 2025-04-21 PROCEDURE — 99211 OFF/OP EST MAY X REQ PHY/QHP: CPT

## 2025-04-21 PROCEDURE — 85610 PROTHROMBIN TIME: CPT

## 2025-04-21 NOTE — PROGRESS NOTES
Medication Management Service  [facility]  [phone]    Visit Date: 4/21/2025   Subjective:       Rah Andino is a 34 y.o. male who presents to clinic today for anticoagulation monitoring and adjustment.    Patient seen in clinic for warfarin management due to  Indication:   mechanical aortic valve.   INR goal: of 2.0-3.0.  Duration of therapy: indefinite.    Patient reports the following:   Adherent with regimen  Missed or extra doses:  None   Bleeding or thromboembolic side effects:  None  Significant medication changes:  None  Significant dietary changes: None  Significant alcohol or tobacco changes: None  Significant recent illness, disease state changes, or hospitalization:  None  Upcoming surgeries or procedures:  None  Falls: None           Assessment and Plan     PT/INR done in office per protocol.   INR today is 2.9, therapeutic.          Plan:  Will continue current regimen of warfarin 7.5mg daily except 5mg on MWF.     Recheck INR in 3 week(s).     Patient verbalized understanding of dosing directions and information discussed. Dosing schedule given to patient including phone number, appointment date, and time. Progress note sent to referring office.    Electronically signed by Eloise Hamm RPH on 4/21/25     For Pharmacy Admin Tracking Only    Intervention Detail:   Total # of Interventions Recommended:   Total # of Interventions Accepted:   Time Spent (min): 15

## 2025-05-05 ENCOUNTER — TELEPHONE (OUTPATIENT)
Dept: PHARMACY | Age: 35
End: 2025-05-05

## 2025-05-05 NOTE — TELEPHONE ENCOUNTER
Faxed paper referral to [place] office for signature.     For Pharmacy Admin Tracking Only    Intervention Detail:   Total # of Interventions Recommended:   Total # of Interventions Accepted:   Time Spent (min): 5

## 2025-05-12 ENCOUNTER — ANTI-COAG VISIT (OUTPATIENT)
Dept: PHARMACY | Age: 35
End: 2025-05-12
Payer: MEDICARE

## 2025-05-12 DIAGNOSIS — Z86.718 HISTORY OF DVT (DEEP VEIN THROMBOSIS): ICD-10-CM

## 2025-05-12 DIAGNOSIS — I42.2 HYPERTROPHIC CARDIOMYOPATHY (HCC): Primary | ICD-10-CM

## 2025-05-12 DIAGNOSIS — Z95.2 S/P AVR: ICD-10-CM

## 2025-05-12 DIAGNOSIS — I48.91 ATRIAL FIBRILLATION, UNSPECIFIED TYPE (HCC): ICD-10-CM

## 2025-05-12 LAB
INTERNATIONAL NORMALIZATION RATIO, POC: 2.5
POC INR: 2.5 (ref 0.9–1.2)
PROTHROMBIN TIME, POC: 29.6
PROTHROMBIN TIME, POC: 29.6 SEC (ref 10–14.3)

## 2025-05-12 PROCEDURE — 99211 OFF/OP EST MAY X REQ PHY/QHP: CPT

## 2025-05-12 PROCEDURE — 85610 PROTHROMBIN TIME: CPT

## 2025-05-12 NOTE — PROGRESS NOTES
Medication Management Service  [facility]  [phone]    Visit Date: 5/12/2025   Subjective:       Rah Andino is a 34 y.o. male who presents to clinic today for anticoagulation monitoring and adjustment.    Patient seen in clinic for warfarin management due to  Indication:    hypertrophic cardiomyopathy , mechanical aortic valve  INR goal: of 2.0-3.0.  Duration of therapy: indefinite.    Patient reports the following:   Adherent with regimen  Missed or extra doses:  None   Bleeding or thromboembolic side effects:  None  Significant medication changes:  None  Significant dietary changes: fluctuating intake of protein supplements- chinese food yesterday   Significant alcohol or tobacco changes: None  Significant recent illness, disease state changes, or hospitalization:  None  Upcoming surgeries or procedures:  None  Falls: None           Assessment and Plan     PT/INR done in office per protocol.   INR today is 2.5, therapeutic.          Plan:  Will continue current regimen of warfarin 7.5mg daily except 5mg on MWF.     Recheck INR in 4 week(s).     Patient verbalized understanding of dosing directions and information discussed. Dosing schedule given to patient including phone number, appointment date, and time. Progress note sent to referring office.    Electronically signed by Eloise Hamm RPH on 5/12/25     For Pharmacy Admin Tracking Only    Intervention Detail:   Total # of Interventions Recommended:   Total # of Interventions Accepted:   Time Spent (min): 15

## 2025-05-12 NOTE — TELEPHONE ENCOUNTER
Received referral by fax.     For Pharmacy Admin Tracking Only    Intervention Detail:   Total # of Interventions Recommended:   Total # of Interventions Accepted:   Time Spent (min): 5

## 2025-06-09 ENCOUNTER — ANTI-COAG VISIT (OUTPATIENT)
Dept: PHARMACY | Age: 35
End: 2025-06-09
Payer: MEDICARE

## 2025-06-09 DIAGNOSIS — Z95.2 S/P AVR: ICD-10-CM

## 2025-06-09 DIAGNOSIS — I42.2 HYPERTROPHIC CARDIOMYOPATHY (HCC): Primary | ICD-10-CM

## 2025-06-09 LAB
INTERNATIONAL NORMALIZATION RATIO, POC: 2.7
POC INR: 2.7 (ref 0.9–1.2)
PROTHROMBIN TIME, POC: 0

## 2025-06-09 PROCEDURE — 99211 OFF/OP EST MAY X REQ PHY/QHP: CPT

## 2025-06-09 PROCEDURE — 85610 PROTHROMBIN TIME: CPT

## 2025-06-09 NOTE — PROGRESS NOTES
Medication Management Service  [facility]  [phone]    Visit Date: 6/9/2025   Subjective:       Rah Andino is a 34 y.o. male who presents to clinic today for anticoagulation monitoring and adjustment.    Patient seen in clinic for warfarin management due to  Indication:    hypertrophic cardiomyopathy, aortic mechanical valve .   INR goal: of 2.0-3.0  Duration of therapy: indefinite.    Patient reports the following:   Adherent with regimen  Missed or extra doses:  None   Bleeding or thromboembolic side effects:  None  Significant medication changes:  None  Significant dietary changes: None  Significant alcohol or tobacco changes: None  Significant recent illness, disease state changes, or hospitalization:  None  Upcoming surgeries or procedures:  None  Falls: None           Assessment and Plan     PT/INR done in office per protocol.   INR today is 2.7, therapeutic.          Plan:  Will continue current regimen of warfarin 7.5mg daily except 5mg on MWF.     Recheck INR in 4 week(s).     Patient verbalized understanding of dosing directions and information discussed. Dosing schedule given to patient including phone number, appointment date, and time. Progress note sent to referring office.    Electronically signed by Eloise Hamm RPH on 6/9/25     For Pharmacy Admin Tracking Only    Intervention Detail:   Total # of Interventions Recommended:   Total # of Interventions Accepted:   Time Spent (min): 15

## 2025-07-07 ENCOUNTER — ANTI-COAG VISIT (OUTPATIENT)
Dept: PHARMACY | Age: 35
End: 2025-07-07
Payer: MEDICARE

## 2025-07-07 DIAGNOSIS — Z95.2 S/P AVR: ICD-10-CM

## 2025-07-07 DIAGNOSIS — I42.2 HYPERTROPHIC CARDIOMYOPATHY (HCC): Primary | ICD-10-CM

## 2025-07-07 LAB
INTERNATIONAL NORMALIZATION RATIO, POC: 2.9
POC INR: 2.9 (ref 0.9–1.2)
PROTHROMBIN TIME, POC: 0

## 2025-07-07 PROCEDURE — 99211 OFF/OP EST MAY X REQ PHY/QHP: CPT

## 2025-07-07 PROCEDURE — 85610 PROTHROMBIN TIME: CPT

## 2025-07-07 NOTE — PROGRESS NOTES
Medication Management Service  [facility]  [phone]    Visit Date: 7/7/2025   Subjective:       Rah Andino is a 34 y.o. male who presents to clinic today for anticoagulation monitoring and adjustment.    Patient seen in clinic for warfarin management due to  Indication:   mechanical aortic valve and hypertrophic cardiomyopathy.   INR goal: of 2.0-3.0.  Duration of therapy: indefinite.    Patient reports the following:   Adherent with regimen  Missed or extra doses:  None   Bleeding or thromboembolic side effects:  None  Significant medication changes:  None  Significant dietary changes: egg rolls, ramen  Significant alcohol or tobacco changes: None  Significant recent illness, disease state changes, or hospitalization:  None  Upcoming surgeries or procedures:  None  Falls: None           Assessment and Plan     PT/INR done in office per protocol.   INR today is 2.9, therapeutic.          Plan:  Will continue current regimen of warfarin 7.5mg daily except 5mg on MWF.     Recheck INR in 4 week(s).     Patient verbalized understanding of dosing directions and information discussed. Dosing schedule given to patient including phone number, appointment date, and time. Progress note sent to referring office.    Electronically signed by Eloise Hamm RPH on 7/7/25     For Pharmacy Admin Tracking Only    Intervention Detail:   Total # of Interventions Recommended:   Total # of Interventions Accepted:   Time Spent (min): 15

## 2025-08-04 ENCOUNTER — TELEPHONE (OUTPATIENT)
Dept: PHARMACY | Age: 35
End: 2025-08-04

## 2025-08-04 ENCOUNTER — APPOINTMENT (OUTPATIENT)
Dept: PHARMACY | Age: 35
End: 2025-08-04
Payer: COMMERCIAL

## 2025-08-04 DIAGNOSIS — I42.2 HYPERTROPHIC CARDIOMYOPATHY (HCC): Primary | ICD-10-CM

## 2025-08-04 DIAGNOSIS — Z95.2 S/P AVR: ICD-10-CM

## 2025-08-07 ENCOUNTER — TELEPHONE (OUTPATIENT)
Dept: PHARMACY | Age: 35
End: 2025-08-07

## 2025-08-07 ENCOUNTER — APPOINTMENT (OUTPATIENT)
Dept: PHARMACY | Age: 35
End: 2025-08-07
Payer: COMMERCIAL

## 2025-08-07 DIAGNOSIS — I42.2 HYPERTROPHIC CARDIOMYOPATHY (HCC): Primary | ICD-10-CM

## 2025-08-07 DIAGNOSIS — Z95.2 S/P AVR: ICD-10-CM

## 2025-08-14 ENCOUNTER — ANTI-COAG VISIT (OUTPATIENT)
Dept: PHARMACY | Age: 35
End: 2025-08-14
Payer: COMMERCIAL

## 2025-08-14 DIAGNOSIS — Z95.2 S/P AVR: ICD-10-CM

## 2025-08-14 DIAGNOSIS — I42.2 HYPERTROPHIC CARDIOMYOPATHY (HCC): Primary | ICD-10-CM

## 2025-08-14 LAB
INTERNATIONAL NORMALIZATION RATIO, POC: 2.6
POC INR: 2.6 (ref 0.9–1.2)
PROTHROMBIN TIME, POC: NORMAL

## 2025-08-14 PROCEDURE — 85610 PROTHROMBIN TIME: CPT

## 2025-08-14 PROCEDURE — 99211 OFF/OP EST MAY X REQ PHY/QHP: CPT
